# Patient Record
Sex: MALE | Race: WHITE | Employment: UNEMPLOYED | URBAN - METROPOLITAN AREA
[De-identification: names, ages, dates, MRNs, and addresses within clinical notes are randomized per-mention and may not be internally consistent; named-entity substitution may affect disease eponyms.]

---

## 2020-10-05 ENCOUNTER — HOSPITAL ENCOUNTER (INPATIENT)
Age: 30
LOS: 4 days | Discharge: HOME | DRG: 885 | End: 2020-10-09
Attending: EMERGENCY MEDICINE | Admitting: PSYCHIATRY & NEUROLOGY
Payer: COMMERCIAL

## 2020-10-05 PROBLEM — F32.A DEPRESSION WITH SUICIDAL IDEATION: Status: ACTIVE | Noted: 2020-10-05

## 2020-10-05 PROBLEM — R45.851 DEPRESSION WITH SUICIDAL IDEATION: Status: ACTIVE | Noted: 2020-10-05

## 2020-10-05 PROCEDURE — 99284 EMERGENCY DEPT VISIT MOD MDM: CPT

## 2020-10-05 PROCEDURE — 6370000000 HC RX 637 (ALT 250 FOR IP): Performed by: PSYCHIATRY & NEUROLOGY

## 2020-10-05 PROCEDURE — 99285 EMERGENCY DEPT VISIT HI MDM: CPT

## 2020-10-05 PROCEDURE — 1240000000 HC EMOTIONAL WELLNESS R&B

## 2020-10-05 RX ORDER — ACETAMINOPHEN 325 MG/1
650 TABLET ORAL EVERY 4 HOURS PRN
Status: DISCONTINUED | OUTPATIENT
Start: 2020-10-05 | End: 2020-10-09 | Stop reason: HOSPADM

## 2020-10-05 RX ORDER — MAGNESIUM HYDROXIDE/ALUMINUM HYDROXICE/SIMETHICONE 120; 1200; 1200 MG/30ML; MG/30ML; MG/30ML
30 SUSPENSION ORAL EVERY 6 HOURS PRN
Status: DISCONTINUED | OUTPATIENT
Start: 2020-10-05 | End: 2020-10-09 | Stop reason: HOSPADM

## 2020-10-05 RX ORDER — TRAZODONE HYDROCHLORIDE 50 MG/1
50 TABLET ORAL NIGHTLY PRN
Status: DISCONTINUED | OUTPATIENT
Start: 2020-10-05 | End: 2020-10-09 | Stop reason: HOSPADM

## 2020-10-05 RX ORDER — POLYETHYLENE GLYCOL 3350 17 G/17G
17 POWDER, FOR SOLUTION ORAL DAILY PRN
Status: DISCONTINUED | OUTPATIENT
Start: 2020-10-05 | End: 2020-10-09 | Stop reason: HOSPADM

## 2020-10-05 RX ORDER — NICOTINE 21 MG/24HR
1 PATCH, TRANSDERMAL 24 HOURS TRANSDERMAL DAILY
Status: DISCONTINUED | OUTPATIENT
Start: 2020-10-06 | End: 2020-10-09 | Stop reason: HOSPADM

## 2020-10-05 RX ORDER — IBUPROFEN 400 MG/1
400 TABLET ORAL EVERY 6 HOURS PRN
Status: DISCONTINUED | OUTPATIENT
Start: 2020-10-05 | End: 2020-10-09 | Stop reason: HOSPADM

## 2020-10-05 RX ORDER — HYDROXYZINE 50 MG/1
50 TABLET, FILM COATED ORAL 3 TIMES DAILY PRN
Status: DISCONTINUED | OUTPATIENT
Start: 2020-10-05 | End: 2020-10-09 | Stop reason: HOSPADM

## 2020-10-05 RX ADMIN — HYDROXYZINE HYDROCHLORIDE 50 MG: 50 TABLET, FILM COATED ORAL at 23:13

## 2020-10-05 RX ADMIN — ACETAMINOPHEN 650 MG: 325 TABLET, FILM COATED ORAL at 23:13

## 2020-10-05 ASSESSMENT — ENCOUNTER SYMPTOMS
ABDOMINAL PAIN: 0
COUGH: 0
VOMITING: 0
DIARRHEA: 0
BACK PAIN: 0
SHORTNESS OF BREATH: 0

## 2020-10-05 ASSESSMENT — PAIN SCALES - GENERAL
PAINLEVEL_OUTOF10: 7
PAINLEVEL_OUTOF10: 5
PAINLEVEL_OUTOF10: 8
PAINLEVEL_OUTOF10: 7

## 2020-10-05 ASSESSMENT — SLEEP AND FATIGUE QUESTIONNAIRES
AVERAGE NUMBER OF SLEEP HOURS: 6
DO YOU HAVE DIFFICULTY SLEEPING: NO
DO YOU USE A SLEEP AID: NO

## 2020-10-05 ASSESSMENT — PAIN DESCRIPTION - PAIN TYPE
TYPE: CHRONIC PAIN
TYPE: CHRONIC PAIN
TYPE: ACUTE PAIN

## 2020-10-05 ASSESSMENT — PATIENT HEALTH QUESTIONNAIRE - PHQ9: SUM OF ALL RESPONSES TO PHQ QUESTIONS 1-9: 12

## 2020-10-05 ASSESSMENT — PAIN DESCRIPTION - LOCATION: LOCATION: NOSE

## 2020-10-05 ASSESSMENT — LIFESTYLE VARIABLES: HISTORY_ALCOHOL_USE: NO

## 2020-10-05 ASSESSMENT — PAIN DESCRIPTION - ORIENTATION: ORIENTATION: RIGHT

## 2020-10-05 ASSESSMENT — PAIN DESCRIPTION - DESCRIPTORS: DESCRIPTORS: DISCOMFORT

## 2020-10-06 PROBLEM — F33.2 SEVERE EPISODE OF RECURRENT MAJOR DEPRESSIVE DISORDER, WITHOUT PSYCHOTIC FEATURES (HCC): Status: ACTIVE | Noted: 2020-10-06

## 2020-10-06 LAB
AMPHETAMINE SCREEN URINE: NEGATIVE
BARBITURATE SCREEN URINE: NEGATIVE
BENZODIAZEPINE SCREEN, URINE: NEGATIVE
BUPRENORPHINE URINE: ABNORMAL
CANNABINOID SCREEN URINE: NEGATIVE
COCAINE METABOLITE, URINE: NEGATIVE
MDMA URINE: ABNORMAL
METHADONE SCREEN, URINE: NEGATIVE
METHAMPHETAMINE, URINE: ABNORMAL
OPIATES, URINE: POSITIVE
OXYCODONE SCREEN URINE: NEGATIVE
PHENCYCLIDINE, URINE: NEGATIVE
PROPOXYPHENE, URINE: ABNORMAL
SARS-COV-2, RAPID: NORMAL
SARS-COV-2: NORMAL
SARS-COV-2: NOT DETECTED
SOURCE: NORMAL
TEST INFORMATION: ABNORMAL
TRICYCLIC ANTIDEPRESSANTS, UR: ABNORMAL

## 2020-10-06 PROCEDURE — 99222 1ST HOSP IP/OBS MODERATE 55: CPT | Performed by: PSYCHIATRY & NEUROLOGY

## 2020-10-06 PROCEDURE — U0003 INFECTIOUS AGENT DETECTION BY NUCLEIC ACID (DNA OR RNA); SEVERE ACUTE RESPIRATORY SYNDROME CORONAVIRUS 2 (SARS-COV-2) (CORONAVIRUS DISEASE [COVID-19]), AMPLIFIED PROBE TECHNIQUE, MAKING USE OF HIGH THROUGHPUT TECHNOLOGIES AS DESCRIBED BY CMS-2020-01-R: HCPCS

## 2020-10-06 PROCEDURE — 6370000000 HC RX 637 (ALT 250 FOR IP): Performed by: PSYCHIATRY & NEUROLOGY

## 2020-10-06 PROCEDURE — 1240000000 HC EMOTIONAL WELLNESS R&B

## 2020-10-06 PROCEDURE — 80307 DRUG TEST PRSMV CHEM ANLYZR: CPT

## 2020-10-06 RX ORDER — SERTRALINE HYDROCHLORIDE 25 MG/1
25 TABLET, FILM COATED ORAL DAILY
Status: DISCONTINUED | OUTPATIENT
Start: 2020-10-06 | End: 2020-10-07

## 2020-10-06 RX ADMIN — HYDROXYZINE HYDROCHLORIDE 50 MG: 50 TABLET, FILM COATED ORAL at 20:30

## 2020-10-06 RX ADMIN — TRAZODONE HYDROCHLORIDE 50 MG: 50 TABLET ORAL at 20:30

## 2020-10-06 RX ADMIN — IBUPROFEN 400 MG: 400 TABLET, FILM COATED ORAL at 20:24

## 2020-10-06 RX ADMIN — SERTRALINE HYDROCHLORIDE 25 MG: 25 TABLET ORAL at 16:47

## 2020-10-06 ASSESSMENT — LIFESTYLE VARIABLES: HISTORY_ALCOHOL_USE: NO

## 2020-10-06 ASSESSMENT — PAIN SCALES - GENERAL: PAINLEVEL_OUTOF10: 5

## 2020-10-06 NOTE — H&P
HISTORY and [facility] [id number]       NAME:  Caity Dukes  MRN: [id number]   YOB: 1990   Date: 10/6/2020   Age: 26 y.o. Gender: male     COMPLAINT AND PRESENT HISTORY:      Caity Dukes is 26 y.o.,  male, admitted via ED because of depression. Per chart review, Pt presented to ED for mental health evaluation for suicidal ideation. Pt voiced visual hallucinations of him committing suicide by cutting his throat with knives. Pt denies current suicidal ideation. Pt denies any homicidal ideation. Pt reported previous suicide attempt when he was a teen by trying to shoot himself but the gun did not discharge. Pt has fair sleep and appetite. No current auditory, visual or tactile hallucinations. Current everyday smoker 2 PPD cigarettes. Smoking cessation encouraged. Pt denies any alcohol use of illicit drug use. Reports he currently lives with his girlfriend. 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 History of left knee surgery 2 years ago with residual knee pain. Rating pain 9/10 in intensity at times. Takes percocet at home with good relief. Tylenol available while here prn. Pt denies any other somatic complaints at this time including chest pain/pressure, palpitations, SOB, recent URI, N/V/D or constipation, fever or chills. DIAGNOSTIC RESULTS     PAST MEDICAL HISTORY   History reviewed. No pertinent past medical history. Pt denies any history of Diabetes mellitus type 2, hypertension, stroke, heart disease, COPD, Asthma, GERD, HLD, Cancer, Seizures,Thyroid disease, Kidney Disease, Hepatitis, TB.    SURGICAL HISTORY       Past Surgical History:   Procedure Laterality Date    KNEE ARTHROSCOPY      TONSILLECTOMY         FAMILY HISTORY     History reviewed. No pertinent family history.     SOCIAL HISTORY       Social History     Socioeconomic History    Marital status: Single     Spouse name: None    Number of children: None    Years of Neuropsychiatric:  See HPI. GENERAL PHYSICAL EXAM:     Vitals: /87   Pulse 92   Temp 97.3 °F (36.3 °C) (Axillary)   Resp 16   Ht 5' 10\" (1.778 m)   Wt 157 lb (71.2 kg)   SpO2 100%   BMI 22.53 kg/m²  Body mass index is 22.53 kg/m². Pt was examined with a nurse present in the room. GENERAL APPEARANCE:  Craig Caputo is 26 y.o.,  male, not obese, nourished, conscious, alert. Does not appear to be in distress or pain at this time. SKIN:  Warm, dry, no cyanosis or jaundice. HEAD:  Normocephalic, atraumatic,. EYES:  Pupils equal, reactive to light, Conjunctiva is clear, EOMs intact jose m. eyelids WNL. EARS:  No discharge, no marked hearing loss. NOSE:  No rhinorrhea, epistaxis or septal deformity. THROAT:  Not congested. No ulceration bleeding or discharge. NECK:  No stiffness, trachea central.  No palpable masses or L.N.      CHEST:  Symmetrical and equal on expansion. HEART:  Regular rate and rhythm. S1 > S2, No audible murmurs or gallops. LUNGS:  Equal on expansion, normal breath sounds. No wheezing, rhonchi or rales. ABDOMEN:  Soft on palpation. No localized tenderness. No guarding or rigidity. No palpable organomegaly. LYMPHATICS:  No palpable cervical lymphadenopathy. LOCOMOTOR, BACK AND SPINE:  No tenderness or deformities. EXTREMITIES:  Symmetrical, no pretibial edema. No calf tenderness. No discoloration or ulcerations. NEUROLOGIC:  The patient is conscious, alert, oriented,Cranial nerve II-XII intact, taste and smell were not examined. No apparent focal sensory or motor deficits. Muscle strength equal Jose M. No facial droop, tongue protrudes centrally, no slurring of the speech. PROVISIONAL DIAGNOSES:      Active Problems:    Depression with suicidal ideation  Resolved Problems:    * No resolved hospital problems.  Gracy Gottron, APRN - CNP on 10/6/2020 at 3:42 PM

## 2020-10-06 NOTE — BH NOTE
Patient given tobacco quitline number 93849667874 at this time, refusing to call at this time, states \" I just dont want to quit now\"- patient given information as to the dangers of long term tobacco use. Continue to reinforce the importance of tobacco cessation.

## 2020-10-06 NOTE — PLAN OF CARE
hours    PLAN/TREATMENT RECOMMENDATIONS:     continue group therapy , medications compliance, goal setting, individualized assessments and care, continue to monitor pt on unit      SHORT-TERM GOALS:   Time frame for Short-Term Goals: 5-7 days    LONG-TERM GOALS:  Time frame for Long-Term Goals: 6 months  Members Present in Team Meeting: See [address] Shala Davis

## 2020-10-06 NOTE — SUICIDE SAFETY PLAN
SAFETY PLAN    A suicide Safety Plan is a document that supports someone when they are having thoughts of suicide. Warning Signs that indicate a suicidal crisis may be developing: What (situations, thoughts, feelings, body sensations, behaviors, etc.) do you experience that lets you know you are beginning to think about suicide? 1. Visions of self harm      Internal Coping Strategies:  What things can I do (relaxation techniques, hobbies, physical activities, etc.) to take my mind off my problems without contacting another person? 1. Thinking about my kids not wanting them to not have a father  2. Not wanting to move my pain to may family         People and social settings that provide distraction: Who can I call or where can I go to distract me? 1. Name: Vu Wright  Phone: [phone]  2. Name: Juliet Downs  Phone: [phone]     People whom I can ask for help: Who can I call when I need help - for example, friends, family, clergy, someone else? 1. Name: Vu Wright                GDCUE:[phone]   2. Name:  Juliet Downs                Phone: [phone]       Professionals or [address] [place], [place] [address]vd I can contact during a crisis: Who can I call for help - for example, my doctor, my psychiatrist, my psychologist, a mental health provider, a suicide hotline? 1. Clinician Name: Not linked   Phone:       Clinician Pager or Emergency Contact #:     2. Clinician Name:    Phone:       Clinician Pager or Emergency Contact #:     3. Suicide Prevention Lifeline: 4-763-873-TALK (3147)    4. [address] [place] [address] Emergency Services -  for example, [facility] suicide hotline, [address] [place] [address] Avenue: [facility]      Emergency Services Address: [facility]       Emergency Services Phone: [phone]    Making the environment safe: How can I make my environment (house/apartment/living space) safer? For example, can I remove guns, medications, and other items?   1. Diana removed from home by Jamie Mcrae Girlfrienbigg

## 2020-10-06 NOTE — FLOWSHEET NOTE
*Patient participated in the Spirituality Group via zoom       10/06/20 1528   Encounter Summary   Services provided to: Patient   Referral/Consult From: Rounding  (zoom)   Continue Visiting   (10/6/20)   Complexity of Encounter Moderate   Length of Encounter 30 minutes   Spiritual/Restorationism   Type Spiritual support   Assessment Calm; Approachable   Intervention Active listening   Outcome Receptive;Engaged in conversation;Expressed gratitude

## 2020-10-06 NOTE — BH NOTE
`Behavioral [facility] [place]  Admission Note     Admission Type:   Admission Type: Voluntary    Reason for admission:  Reason for Admission: Suicidal ideations with no specific plan; Visual Hallucinations of dark figures    PATIENT STRENGTHS:  Strengths: Communication, Employment    Patient Strengths and Limitations:  Limitations: Difficulty problem solving/relies on others to help solve problems    Addictive Behavior:   Addictive Behavior  In the past 3 months, have you felt or has someone told you that you have a problem with:  : None  Do you have a history of Chemical Use?: No  Do you have a history of Alcohol Use?: No  Do you have a history of Street Drug Abuse?: No  Histroy of Prescripton Drug Abuse?: Yes    Medical Problems:   History reviewed. No pertinent past medical history.     Status EXAM:  Status and Exam  Normal: Yes  Affect: Appropriate  Level of Consciousness: Alert  Mood:Normal: No  Mood: Anxious  Motor Activity:Normal: Yes  Interview Behavior: Cooperative  Preception: [place] to Person, Layvonne Steve to Time, [place] to Place, [place] to Situation  Attention:Normal: No  Attention: Distractible  Thought Processes: Circumstantial  Thought Content:Normal: No  Thought Content: Preoccupations  Hallucinations: Visual (Comment)(Reports seeing dark figures)  Delusions: No  Memory:Normal: No  Memory: Poor Recent, Poor Remote  Insight and Judgment: No  Insight and Judgment: Poor Judgment, Poor Insight  Present Suicidal Ideation: No  Present Homicidal Ideation: No    Tobacco Screening:  Practical Counseling, on admission, emil X, if applicable and completed (first 3 are required if patient doesn't refuse):            ( )  Recognizing danger situations (included triggers and roadblocks)                    ( )  Coping skills (new ways to manage stress, exercise, relaxation techniques, changing routine, distraction)                                                           ( )  Basic information about quitting (benefits of quitting, techniques in how to quit, available resources  ( ) Referral for counseling faxed to Rebecca                                           ( X) Patient refused counseling  ( ) Patient has not smoked in the last 30 days    Metabolic Screening:    No results found for: LABA1C    No results found for: CHOL  No results found for: TRIG  No results found for: HDL  No components found for: LDLCAL  No results found for: LABVLDL      Body mass index is 22.53 kg/m². BP Readings from Last 2 Encounters:   10/05/20 128/67   12/15/16 140/88           Pt admitted with followings belongings:  Dentures: None  Vision - Corrective Lenses: None  Hearing Aid: None  Jewelry: Earrings, Other (Comment)(1 clear stoned earring; Tongue piercing)  Body Piercings Removed: No  Clothing: Socks, Pajamas, Shirt, Footwear, Pants, Jacket / coat  Were All Patient Medications Collected?: Not Applicable  Other Valuables: Keys, Other (Comment), Wallet(Lighter;$5;[id number]; Social Secuirty Card; Drivers license)     Valuables sent home with N/A. Valuables placed in safe in security envelope, number:  \[id number]. Patient's home medications need verified. Patient oriented to surroundings and program expectations and copy of patient rights given. Received admission packet:  Yes. Consents reviewed, signed Yes. Refused the photograph release form. Patient verbalize understanding:  yes. Patient education on precautions: yes    Pt admitted to 209 per MD order. Pt changed into hospital attire. Pt scanned with metal detector. All pt belongings checked for contraband and locked in utility closet. Pt oriented to unit and rules. Handbook given. Nourishment provided. MD paged for orders. Will monitor pt for safety and behavior. Patient denies all during admission except visual hallucinations of dark figures and increased anxiety due to being in a new place.  Patient reports having on going suicidal ideations with his most recent visualizing himself writing a suicide note. Patient reports attempting to shoot self at the age of 10 but the firearm did not fire. Patient is not connected to any Mental health providers. Patient reports chronic pain and states he is prescribed percocet for the pain. Patient reported accidentally overdosing on fentanyl 2 months ago when he tried to by percocet off the street.                       Francisco Peraza RN

## 2020-10-06 NOTE — ED NOTES
Paperwork and pt's belongings provided to [facility] staff. Pt escorted to [facility] via two [facility] staff members. Pt cooperative exiting [facility].

## 2020-10-06 NOTE — ED NOTES
Provisional Diagnosis:  Depression with suicidal ideation      Psychosocial and Contextual Factors: Pt has issues with social enviroment. Pt has issues with relationships. Pt is currently off pt's medications. C-SSRS Summary:    Patient: X    Family:     Agency: X (EPIC)    Present Suicidal Behavior:     Verbal: X    Attempt:     Past Suicidal Behavior:     Verbal: X    Attempt: X    Self- Injurious/ Self-Mutilation:  Pt denies    Trauma History:Pt denies    Protective Factors: Pt has housing. Pt has support. Pt is employed. Risk Factors: Pt has poor judgement and coping skills. Pt is not linked. Substance Abuse: Pt reports \"rare\" alcohol abuse. Pt has purchased Perc 30 off the street when pt is out of pt's script. Clinical Summary:  Elizabeth Larry is a 26year old male who presents to the ED via friend. Pt is suicidal. Pt has been envisioning self committing suicide for the past two months. Pt reports pt decided pt needed help after pt envisioned self writing a suicide note last week. Pt started having these visions at the end of July this year after pt took what pt thought was a perc 30 but was actually sold fetanyl and overdosed at home. Pt's girlfriend found pt and it took 4 shots of narcan to save pt. Pt reports pt is prescribed perc 10's but ran out of them and purchased the perc 30 off of the street. Pt denies having substance abuse issues with percocet's. Pt denies the use of illegal drugs. Pt reports \"rarely\" drinking alcohol. Pt denies HI. Pt attempted suicide at the age of 10 by putting a loaded gun to pt's head and pulling the trigger. The gun did not go off. Pt reports pt sees dark figures walking around at times and has been for the past 6 years. Pt has been previously diagnosed with severe depression, bipolar disorder and PTSD , per pt. Pt has been prescribed Zoloft in the past and felt as though it helped pt. Pt is not linked. Pt has no previous inpatient psychiatric hospitalizations. Pt denies substance abuse. Pt reports poor sleep and a decrease in pt's appetite. Level of Care Disposition:. SW consulted wit Kristopher Joshi from psychiatry, Pt accepted for an inpatient admission to the [facility] for safety and stabilization.

## 2020-10-06 NOTE — PLAN OF CARE
Problem: Altered Mood, Depressive Behavior:  Goal: Ability to disclose and discuss suicidal ideas will improve  Description: Ability to disclose and discuss suicidal ideas will improve  Outcome: Ongoing   Patient remained isolative to his room throughout the majority of this shift. Patient did come to the dayroom to participate in spiritual group via tablet. Patient has been calm/cooperative this shift. He has not showered this shift, is wearing own clothes. Patient denies depression/anxiety, also denies suicidal/homicidal ideation this shift. Problem: Altered Mood, Depressive Behavior:  Goal: Absence of self-harm  Description: Absence of self-harm  Outcome: Ongoing   Patient denies suicidal ideation this shift. Problem: Tobacco Use:  Goal: Inpatient tobacco use cessation counseling participation  Description: Inpatient tobacco use cessation counseling participation  Outcome: Ongoing   Patient using nicotine patch per order. Patch placed this morning to right shoulder. Problem: Pain:  Goal: Control of chronic pain  Description: Control of chronic pain  Outcome: Ongoing   Patient admits to chronic pain, no complaints of uncontrolled pain this shift.

## 2020-10-06 NOTE — BH NOTE
During 1:1 interview, patient states he saw \"dark figures\" in his room last night. States they do not scare him, nor do they speak to him. Patient states he has been seeing these \"dark figures\" for the past 6 years. Denies suicidal/homicidal ideation, patient flat, calm/cooperative.

## 2020-10-06 NOTE — PROGRESS NOTES
Pharmacy Medication History Note      List of current medications patient is taking is complete. Source of information: Camilla Anglin, [facility])    Changes made to medication list:  Medications removed (include reason, ex. therapy complete or physician discontinued, noncompliance):  none    Medications added/doses adjusted:  none    Other notes (ex. Recent course of antibiotics, Coumadin dosing): The patient filled a 30 day supply of Percocet in August at [facility] and a 13 day supply at [facility] in July. He has not filled any other prescriptions at either pharmacy since 2016. Please let me know if you have any questions about this encounter. Thank you!     Electronically signed by Jimbo Queen, [facility] on 10/6/2020 at 9:43 AM

## 2020-10-06 NOTE — PROGRESS NOTES
Admission note  Behavioral Health  10/6/2020  CHIEF COMPLAINT: Suicidal ideation    Reviewed patient's current plan of care and vital signs with nursing staff. Sleep:  8 hours last night  Attending groups: No on COVID unit    SUBJECTIVE:    Met with patient in his room. Patient was admitted for suicidal ideation without a plan. He reports that he felt unsafe when he imagined himself writing his own suicide letter. He presented himself to the emergency room. 1 past suicide attempt age 10 attempted to shoot self gun would not go off. Denies any previous psychiatric hospitalizations or medications. he denies any psychiatric provider in the community and denies being on any psychiatric medications. He reports that he has been more depressed since an accidental overdose a month ago. Last month he had ran out of his pain medications and bought Percocet on the street, it had fentanyl in it, and he overdosed. He is seeing a pain medicine specialist for a ruptured disc in his back and arthritis in his neck. Reviewed O a RRS last filled Percocet 8/10/2020 #50, he had a previous fill before in July 2020, otherwise OARRs was unremarkable. He rates that he is prescribed Percocet 10 to take up to 4 times a day for 30-day.,  And he often runs out early. He denies overtaking medication, reports that he usually runs out 2 weeks before he can refill it. When he runs out he has a spot Percocet from the street and obtained from his family members. He denies daily use of pain medication, he denies any withdrawal symptoms when not taking pain medication, he denies any legal problems related to opiate medication use. He is currently living with his girlfriend, he is employed and works full-time, he has 1 young daughters. He denies any substance use or abuse in the past.  His urine drug screen was positive for opiates only. Reports that he took Norco from his \"aunt\" a few days ago.   He denies that he has any substance use problems, and is resistant to any recovery services. He currently denies any withdrawal symptoms. Discussed the empty Suboxone wrapper in his wallet, he reports that he was holding it for a friend, denies using any Suboxone. Per chart there are reports of visual hallucinations of seeing \"dark figures\". He denies seeing any dark figures during interview, denies any voices. He currently rates his mood 5 out of 10 (10 best) he denies any suicidal ideation intent or plan  Reports that his appetite is improved today and he is feeling slightly better  Per staff patient compliant with medications and is interactive with staff    Mental Status Exam  Level of consciousness:  Within normal limits  Appearance: Hospital attire, sitting up in bed, with good grooming and hygiene   Behavior/Motor: No abnormalities noted  Attitude toward examiner:  Cooperative, attentive, good eye contact  Speech:  spontaneous, normal rate, normal volume and well articulated  Mood: \"Okay\" rates 5/10  Affect: Guarded  Thought processes:  linear and coherent  Thought content:  denies homicidal ideation  Suicidal Ideation: Denies suicidal ideation  Delusions:  no evidence of delusions  Perceptual Disturbance:  denies any perceptual disturbance during interview, does not appear to be responding to any internal stimuli  Cognition:  Oriented to self, location, time, and situation  Memory: age appropriate  Insight & Judgement: Poor  Medication side effects:  denies       Data   height is 5' 10\" (1.778 m) and weight is 157 lb (71.2 kg). His axillary temperature is 97.3 °F (36.3 °C). His blood pressure is 117/87 and his pulse is 92. His respiration is 16 and oxygen saturation is 100%. Labs:   Admission on 10/05/2020   Component Date Value Ref Range Status    SARS-CoV-2 10/06/2020 PENDING   Incomplete    SARS-CoV-2, Rapid 10/06/2020        Final    Source 10/06/2020 . NASOPHARYNGEAL SWAB   Final    SARS-CoV-2 10/06/2020        Final    Amphetamine Screen, Ur 10/06/2020 NEGATIVE  NEGATIVE Final    Comment:       (Positive cutoff 1000 ng/mL)                  Barbiturate Screen, Ur 10/06/2020 NEGATIVE  NEGATIVE Final    Comment:       (Positive cutoff 200 ng/mL)                  Benzodiazepine Screen, Urine 10/06/2020 NEGATIVE  NEGATIVE Final    Comment:       (Positive cutoff 200 ng/mL)                  Cocaine Metabolite, Urine 10/06/2020 NEGATIVE  NEGATIVE Final    Comment:       (Positive cutoff 300 ng/mL)                  Methadone Screen, Urine 10/06/2020 NEGATIVE  NEGATIVE Final    Comment:       (Positive cutoff 300 ng/mL)                  Opiates, Urine 10/06/2020 POSITIVE* NEGATIVE Final    Comment:       (Positive cutoff 300 ng/mL)                  Phencyclidine, Urine 10/06/2020 NEGATIVE  NEGATIVE Final    Comment:       (Positive cutoff 25 ng/mL)                  Propoxyphene, Urine 10/06/2020 NOT REPORTED  NEGATIVE Final    Cannabinoid Scrn, Ur 10/06/2020 NEGATIVE  NEGATIVE Final    Comment:       (Positive cutoff 50 ng/mL)                  Oxycodone Screen, Ur 10/06/2020 NEGATIVE  NEGATIVE Final    Comment:       (Positive cutoff 100 ng/mL)                  Methamphetamine, Urine 10/06/2020 NOT REPORTED  NEGATIVE Final    Tricyclic Antidepressants, Urine 10/06/2020 NOT REPORTED  NEGATIVE Final    MDMA, Urine 10/06/2020 NOT REPORTED  NEGATIVE Final    Buprenorphine Urine 10/06/2020 NOT REPORTED  NEGATIVE Final    Test Information 10/06/2020 Assay provides medical screening only. The absence of expected drug(s) and/or metabolite(s) may indicate diluted or adulterated urine, limitations of testing or timing of collection. Final    Comment: Testing for legal purposes should be confirmed by another method. To request confirmation   of test result, please call the lab within 7 days of sample submission.               Medications  Current Facility-Administered Medications: acetaminophen (TYLENOL) tablet 650 mg, 650 mg, Oral, Q4H PRN  aluminum & magnesium hydroxide-simethicone (MAALOX) 200-200-20 MG/5ML suspension 30 mL, 30 mL, Oral, Q6H PRN  hydrOXYzine (ATARAX) tablet 50 mg, 50 mg, Oral, TID PRN  ibuprofen (ADVIL;MOTRIN) tablet 400 mg, 400 mg, Oral, Q6H PRN  nicotine (NICODERM CQ) 14 MG/24HR 1 patch, 1 patch, Transdermal, Daily  polyethylene glycol (GLYCOLAX) packet 17 g, 17 g, Oral, Daily PRN  traZODone (DESYREL) tablet 50 mg, 50 mg, Oral, Nightly PRN    ASSESSMENT  Severe episode of recurrent major depressive disorder, without psychotic features ([facility] [address]     PLAN  Discussed plan with Dr. Banerjee  Attempt to develop insight  Psycho-education conducted. Supportive Therapy conducted. Probable discharge is indeterminate  Follow-up daily while inpatient        Electronically signed by RICHMOND Ng CNP on 10/6/20 at 3:11 PM EDT    **This report has been created using voice recognition software. It may contain minor errors which are inherent in voice recognition technology. **    I have examined the patient and confirmed the NP's findings. I agree with the plan above. Additional information noted below-    I have noted the past psychiatric history above    Drug history-patient is on prescription opioids for spinal vertebral problems. He has a history of using opioids illicitly    Personal history-patient has 4 minor daughters. He cites them as a protective factor.   He has custody of them every other weekend    Family history-the patient denies any history of psychiatric problems in his family    Plan-Will start the patient on Zoloft 50 mg daily    Cheng Friedman
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
## 2020-10-06 NOTE — PLAN OF CARE
Problem: Altered Mood, Depressive Behavior:  Goal: Able to verbalize and/or display a decrease in depressive symptoms  Description: Able to verbalize and/or display a decrease in depressive symptoms  Outcome: Ongoing     Problem: Altered Mood, Depressive Behavior:  Goal: Ability to disclose and discuss suicidal ideas will improve  Description: Ability to disclose and discuss suicidal ideas will improve  10/6/2020 1926 by Clifton Campoverde RN  Outcome: Ongoing     Problem: Altered Mood, Depressive Behavior:  Goal: Absence of self-harm  Description: Absence of self-harm  10/6/2020 1926 by Clifton Campoverde RN  Outcome: Ongoing     Patient is free from self harm today. Patient denies any feelings of anxiety or depression during talk time. Patient also denies suicidal ideations, homicidal ideations and auditory hallucinations. Patient reports seeing dark figures and states he has been seeing them for years. Patient states they do not frighten him nor do they try to communicate with him. Patient discussed wanting to be set up with a pain management clinic to help reduce his chronic pain. Patient informed of medication ordered to assist with pain while in patient and is accepting of it at this time. Q 15 minute checks maintained for patient safety.
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
## 2020-10-07 PROBLEM — F31.9 BIPOLAR DEPRESSION (HCC): Status: ACTIVE | Noted: 2020-10-07

## 2020-10-07 PROCEDURE — 6370000000 HC RX 637 (ALT 250 FOR IP): Performed by: NURSE PRACTITIONER

## 2020-10-07 PROCEDURE — 1240000000 HC EMOTIONAL WELLNESS R&B

## 2020-10-07 PROCEDURE — 99232 SBSQ HOSP IP/OBS MODERATE 35: CPT | Performed by: PSYCHIATRY & NEUROLOGY

## 2020-10-07 PROCEDURE — 6370000000 HC RX 637 (ALT 250 FOR IP): Performed by: PSYCHIATRY & NEUROLOGY

## 2020-10-07 RX ORDER — FLUOXETINE 10 MG/1
10 CAPSULE ORAL DAILY
Status: DISCONTINUED | OUTPATIENT
Start: 2020-10-08 | End: 2020-10-09 | Stop reason: HOSPADM

## 2020-10-07 RX ORDER — OLANZAPINE 10 MG/1
5 TABLET, ORALLY DISINTEGRATING ORAL NIGHTLY
Status: DISCONTINUED | OUTPATIENT
Start: 2020-10-07 | End: 2020-10-09 | Stop reason: HOSPADM

## 2020-10-07 RX ADMIN — SERTRALINE HYDROCHLORIDE 25 MG: 25 TABLET ORAL at 09:51

## 2020-10-07 RX ADMIN — OLANZAPINE 5 MG: 10 TABLET, ORALLY DISINTEGRATING ORAL at 22:17

## 2020-10-07 RX ADMIN — TRAZODONE HYDROCHLORIDE 50 MG: 50 TABLET ORAL at 22:17

## 2020-10-07 RX ADMIN — HYDROXYZINE HYDROCHLORIDE 50 MG: 50 TABLET, FILM COATED ORAL at 22:17

## 2020-10-07 NOTE — GROUP NOTE
Group Therapy Note    Date: 10/7/2020    Group Start Time: 1100  Group End Time: 4494  Group Topic: Psychoeducation    VINNY Guillen, [address]        Group Therapy Note    Attendees: 5/14      Pt did not attend RT skills group d/t resting in room despite staff invitation to attend. 1:1 talk time offered as alternative to group session, pt declined.

## 2020-10-07 NOTE — PROGRESS NOTES
Daily Progress Note  Behavioral Health  10/7/2020  CHIEF COMPLAINT: Suicidal ideation    Reviewed patient's current plan of care and vital signs with nursing staff. Sleep:  7 hours last night  Attending groups: Yes    SUBJECTIVE:    Met with Javid Etienne in his room. He was more open to talk about his past psychiatric issues today. He reports that he was diagnosed approximately 7 years ago with bipolar and PTSD. He had been seeing Dr. Glenna Victoria for psychiatry, he reports that he was on Zoloft 200 mg daily, Abilify 5 mg daily and another unknown medication. He said that he felt that the medication had caused him to feel angry all the time and had stopped taking it, he has been nonmedicated for numerous years. Today he reports that he has periods of elevated mood with feelings of grandiosity, decreased need to sleep, impulsiveness, and racing thoughts. This is followed by a period of irritability with decreased need to sleep and racing thoughts. He is reports the manic episodes last approximately 7 days, he experiences them 5 times a year, unsure when last episode was. He reports that he is more often depressed than he feels manic. During his depression he has lack of energy fatigue problems with sleep problems with appetite and suicidal ideations. He also reports that he sees \"shadow figures\" only during his depression, he denies that these figures talk to him he says that he usually sees him out of the corner of his eye but that they resolve during times of normal mood and melvina. He last reports seeing the shadows 3 nights ago has not seen any since. He endorses that he was depressed and suicidal but is beginning to feel better. Rates his mood 6 out of 10 (10 best). He denies any current suicidal ideation intent or plan. He started attending groups today reports his appetite is good and feels safe on the unit.   He reports he like to follow-up with [facility] in the community because it is near his house outpatient once discharged. We discussed medications today. He would like to try medication for his depression and mood instability. We discussed Prozac and Zyprexa reviewed with him side effects risk benefit and alternative treatment and he would like to try this medication regimen. Mental Status Exam  Level of consciousness:  Within normal limits  Appearance: T-shirt and pajama pants, seated in chair, with good grooming and hygiene   Behavior/Motor: Calm no psychomotor  abnormalities noted  Attitude toward examiner:  Cooperative, attentive, good eye contact  Speech:  spontaneous, normal rate, normal volume and well articulated  Mood: \"Okay\" reports improvement of mood since being admitted  Affect: Broad  Thought processes:  linear, goal directed and coherent  Thought content:  denies homicidal ideation  Suicidal Ideation: Denies suicidal ideation  Delusions:  no evidence of delusions  Perceptual Disturbance:  denies any perceptual disturbance  Cognition:  Oriented to self, location, time, and situation  Memory: age appropriate  Insight & Judgement: improving  Medication side effects:  denies       Data   height is 5' 10\" (1.778 m) and weight is 157 lb (71.2 kg). His oral temperature is 98 °F (36.7 °C). His blood pressure is 118/60 and his pulse is 85. His respiration is 16 and oxygen saturation is 100%. Labs:   Admission on 10/05/2020   Component Date Value Ref Range Status    SARS-CoV-2 10/06/2020 Not Detected  Not Detected Final    Comment:       The specimen is NEGATIVE for SARS-CoV-2, the novel coronavirus associated with COVID-19. A negative result does not rule out COVID-19. This test has been authorized by the FDA under an Emergency Use Authorization (EUA) for use   by authorized laboratories. [employer] SARS-CoV-2 Reagents for [employer] System are designed to detect the virus that causes   COVID-19 in patients with signs and symptoms of infection who are suspected of COVID-19.   An individual without symptoms of COVID-19 and who is not shedding SARS-CoV-2 virus would   expect to have a negative (not detected) result in this assay. Fact sheet for Healthcare Providers: [employer]stattoo[employer]com  Fact sheet for Patients: kstattoo.com        METHODOLOGY: RT-PCR      SARS-CoV-2, Rapid 10/06/2020        Final    Source 10/06/2020 . NASOPHARYNGEAL SWAB   Final    SARS-CoV-2 10/06/2020        Final    Amphetamine Screen, Ur 10/06/2020 NEGATIVE  NEGATIVE Final    Comment:       (Positive cutoff 1000 ng/mL)                  Barbiturate Screen, Ur 10/06/2020 NEGATIVE  NEGATIVE Final    Comment:       (Positive cutoff 200 ng/mL)                  Benzodiazepine Screen, Urine 10/06/2020 NEGATIVE  NEGATIVE Final    Comment:       (Positive cutoff 200 ng/mL)                  Cocaine Metabolite, Urine 10/06/2020 NEGATIVE  NEGATIVE Final    Comment:       (Positive cutoff 300 ng/mL)                  Methadone Screen, Urine 10/06/2020 NEGATIVE  NEGATIVE Final    Comment:       (Positive cutoff 300 ng/mL)                  Opiates, Urine 10/06/2020 POSITIVE* NEGATIVE Final    Comment:       (Positive cutoff 300 ng/mL)                  Phencyclidine, Urine 10/06/2020 NEGATIVE  NEGATIVE Final    Comment:       (Positive cutoff 25 ng/mL)                  Propoxyphene, Urine 10/06/2020 NOT REPORTED  NEGATIVE Final    Cannabinoid Scrn, Ur 10/06/2020 NEGATIVE  NEGATIVE Final    Comment:       (Positive cutoff 50 ng/mL)                  Oxycodone Screen, Ur 10/06/2020 NEGATIVE  NEGATIVE Final    Comment:       (Positive cutoff 100 ng/mL)                  Methamphetamine, Urine 10/06/2020 NOT REPORTED  NEGATIVE Final    Tricyclic Antidepressants, Urine 10/06/2020 NOT REPORTED  NEGATIVE Final    MDMA, Urine 10/06/2020 NOT REPORTED  NEGATIVE Final    Buprenorphine Urine 10/06/2020 NOT REPORTED  NEGATIVE Final    Test Information 10/06/2020 Assay provides medical screening only. The absence of expected drug(s) and/or metabolite(s) may indicate diluted or adulterated urine, limitations of testing or timing of collection. Final    Comment: Testing for legal purposes should be confirmed by another method. To request confirmation   of test result, please call the lab within 7 days of sample submission. Medications  Current Facility-Administered Medications: sertraline (ZOLOFT) tablet 25 mg, 25 mg, Oral, Daily  acetaminophen (TYLENOL) tablet 650 mg, 650 mg, Oral, Q4H PRN  aluminum & magnesium hydroxide-simethicone (MAALOX) 200-200-20 MG/5ML suspension 30 mL, 30 mL, Oral, Q6H PRN  hydrOXYzine (ATARAX) tablet 50 mg, 50 mg, Oral, TID PRN  ibuprofen (ADVIL;MOTRIN) tablet 400 mg, 400 mg, Oral, Q6H PRN  nicotine (NICODERM CQ) 14 MG/24HR 1 patch, 1 patch, Transdermal, Daily  polyethylene glycol (GLYCOLAX) packet 17 g, 17 g, Oral, Daily PRN  traZODone (DESYREL) tablet 50 mg, 50 mg, Oral, Nightly PRN    ASSESSMENT    Bipolar disorder, depressed. PLAN  Patient s symptoms   show no change  Discussed plan with Dr. Banerjee  Start Prozac 10 mg by mouth daily  Start Zyprexa 5 mg by mouth nightly  She will work to link patient with [facility] for outpatient psychiatry  Ordered labs for tomorrow CBC, CMP, TSH, lipids  Attempt to develop insight  Psycho-education conducted. Supportive Therapy conducted. Probable discharge is 2 to 3 days  Follow-up daily while inpatient    Electronically signed by RICHMOND Stover CNP on 10/7/20 at 11:00 AM EDT    **This report has been created using voice recognition software. It may contain minor errors which are inherent in voice recognition technology. **    I have examined the patient and confirmed the NP's findings. I agree with the plan above.   Additional information noted below-  Agree with plan above    Vladimir Lezama

## 2020-10-07 NOTE — PLAN OF CARE
Problem: Altered Mood, Depressive Behavior:  Goal: Ability to disclose and discuss suicidal ideas will improve  Description: Ability to disclose and discuss suicidal ideas will improve  10/7/2020 1116 by Sara Pisano RN  Outcome: Ongoing   Pt denies suicidal ideations and agrees to feeling safe on the unit. Pt agrees to seeking out staff for any needs. Problem: Altered Mood, Depressive Behavior:  Goal: Able to verbalize and/or display a decrease in depressive symptoms  Description: Able to verbalize and/or display a decrease in depressive symptoms  10/7/2020 1116 by Sara Pisano RN  Outcome: Ongoing   Pt reports decreased depression and mood. Pt is medication compliant. Pt is accepting of and cooperative during talk time. Pt is disheveled and encouraged to tend to hygiene and ADL's. Pt. Remains on q15 min checks and frequent spontaneous checks throughout shift. Pt. Safety maintained.

## 2020-10-07 NOTE — BH NOTE
PRN Note    Pt is agitated as evidence by Yelling at staff, pacing, and throwing items out of her room. Staff attempted to find and relieve the distress by talking to patient, offering snack, and trying to refocus attention by offering to put something she would prefer on TV. Pt continues flip off staff, curse at staff, and call staff \"fuckers\" and \"illuminati fuckers. \" Haldol 5mg and Ativan 2mg IM given for increased agitation. Patient allowed IM but was verbally aggressive during the administration.

## 2020-10-07 NOTE — PLAN OF CARE
[facility]  Day 3 Interdisciplinary Treatment Plan NOTE    Review Date & Time: 10/7/2020 0931    Admission Type:   Admission Type: Voluntary    Reason for admission:  Reason for Admission: Suicidal ideations with no specific plan; Visual Hallucinations of dark figures  Estimated Length of Stay: 5-7 days  Estimated Discharge Date Update: to be determined by physician    PATIENT STRENGTHS:  Patient Strengths Strengths: Communication, Employment  Patient Strengths and Limitations:Limitations: Multiple barriers to leisure interests(Chronic pain; Recent cange in mental health status)  Addictive Behavior:Addictive Behavior  In the past 3 months, have you felt or has someone told you that you have a problem with:  : None  Do you have a history of Chemical Use?: No  Do you have a history of Alcohol Use?: No  Do you have a history of Street Drug Abuse?: No  Histroy of Prescripton Drug Abuse?: Yes(Bought some percocet in July off a friend. No current use)  Medical Problems:History reviewed. No pertinent past medical history.     Risk:  Fall RiskTotal: 57  Bill Scale Bill Scale Score: 22  BVC Total: 0  Change in scores no Changes to plan of Care no    Status EXAM:   Status and Exam  Normal: Yes  Facial Expression: Brightened  Affect: Appropriate  Level of Consciousness: Alert  Mood:Normal: Yes  Mood: Anxious, Elated  Motor Activity:Normal: No  Motor Activity: Decreased  Interview Behavior: Cooperative  Preception: [place] to Person, Isidor Babinski to Time, [place] to Place, [place] to Situation  Attention:Normal: Yes  Attention: Distractible  Thought Processes: Circumstantial  Thought Content:Normal: Yes  Thought Content: Poverty of Content  Hallucinations: Visual (Comment)(Dark figures)  Delusions: No  Memory:Normal: No  Memory: Poor Remote  Insight and Judgment: No  Insight and Judgment: Poor Judgment  Present Suicidal Ideation: No  Present Homicidal Ideation: No    Daily Assessment Last Entry:             Patient Currently in Pain: Denies       Patient Monitoring:       Psychiatric Symptoms:   Depression Symptoms  Depression Symptoms: Isolative  Anxiety Symptoms  Anxiety Symptoms: No problems reported or observed. Jacquelyn Symptoms  Jacquelyn Symptoms: No problems reported or observed. Psychosis Symptoms  Delusion Type: No problems reported or observed. Suicide Risk CSSR-S:  1) Within the past month, have you wished you were dead or wished you could go to sleep and not wake up? : Yes  2) Have you actually had any thoughts of killing yourself? : Yes  3) Have you been thinking about how you might kill yourself? : No  5) Have you started to work out or worked out the details of how to kill yourself? Do you intend to carry out this plan? : No  6) Have you ever done anything, started to do anything, or prepared to do anything to end your life?: Yes  Change in Result no Change in Plan of care no      EDUCATION:   EDUCATION:   Learner Progress Toward Treatment Goals: Reviewed results and recommendations of this team, Reviewed group plan and strategies, Reviewed signs, symptoms and risk of self harm and violent behavior, Reviewed goals and plan of care    Method:small group, individual verbal education    Outcome:verbalized by patient, but needs reinforcement to obtain goals    PATIENT GOALS:  Short term: To talk to the doctor about his medication and to link with [facility]  Long term: To follow up with [facility] and continue his medication    PLAN/TREATMENT RECOMMENDATIONS UPDATE: continue with group therapies, increased socialization, continue planning for after discharge goals, continue with medication compliance    SHORT-TERM GOALS UPDATE:   Time frame for Short-Term Goals: 5-7 days    LONG-TERM GOALS UPDATE:   Time frame for Long-Term Goals: 6 months  Members Present in Team Meeting: See [address], [address]

## 2020-10-07 NOTE — GROUP NOTE
Group Therapy Note    Date: 10/7/2020    Group Start Time: 0900  Group End Time: 0915  Group Topic: Community Meeting    VINNY B[facility] A    [place], [place]        Group Therapy Note    Attendees: 5/13         Patient's Goal:  To increase interpersonal interaction. Notes:  Pt attended and participated in group. Status After Intervention:  Improved    Participation Level:  Active Listener and Interactive    Participation Quality: Appropriate, Attentive and Sharing      Speech:  normal      Thought Process/Content: Logical      Affective Functioning: Congruent      Mood: euthymic      Level of consciousness:  Alert and Attentive      Response to Learning: Able to verbalize current knowledge/experience, Able to retain information and Progressing to goal      Endings: None Reported    Modes of Intervention: Education, Support, Socialization, Clarifying and Reality-testing      Discipline Responsible: Psychoeducational Specialist      Signature:  Sixto Shah

## 2020-10-07 NOTE — GROUP NOTE
Group Therapy Note    Date: 10/7/2020    Group Start Time: 1000  Group End Time: 1871  Group Topic: Psychotherapy    VINNY GUARDADO    CORINE Dickson, GLADYSW        Group Therapy Note    Attendees: 8/14         Patient's Goal: Interpersonal relationships and communication    Notes:  Sharing/supportive     Status After Intervention:  Improved    Participation Level: Interactive    Participation Quality: Appropriate, Attentive, Sharing and Supportive      Speech:  normal      Thought Process/Content: Logical  Linear      Affective Functioning: Congruent      Mood: euthymic      Level of consciousness:  Alert, Oriented x4 and Attentive      Response to Learning: Able to verbalize current knowledge/experience, Capable of insight and Able to change behavior      Endings: None Reported    Modes of Intervention: Support      Discipline Responsible: /Counselor      Signature:   CORINE Dickson, EBONY

## 2020-10-07 NOTE — GROUP NOTE
Group Therapy Note    Date: 10/7/2020    Group Start Time: 1330  Group End Time: 9144  Group Topic: Music Therapy    VINNY GUARDADO    Judeen Klinefelter        Group Therapy Note    Attendees: 8/15       Patient's Goal:  Patient selected preferred music and related their song choice to a mental health related quote. Notes:  Patient attended and participated in group, singing along quietly to music and engaging conversations related to songs. Patient selected the song Numb by [facility] and related it to a quote about anger. Patient had positive interactions talking to peers and this writer during this topic, and was an active listener throughout the rest of group. Status After Intervention:  Improved    Participation Level:  Active Listener and Interactive    Participation Quality: Appropriate, Attentive and Sharing      Speech:  normal      Thought Process/Content: Logical  Linear      Affective Functioning: Congruent      Mood: euthymic      Level of consciousness:  Alert and Attentive      Response to Learning: Able to verbalize current knowledge/experience and Progressing to goal      Endings: None Reported    Modes of Intervention: Education, Support, Exploration, Clarifying, Activity, Media and Reality-testing      Discipline Responsible: Psychoeducational Specialist      Signature:  Judeen Klinefelter

## 2020-10-07 NOTE — GROUP NOTE
Group Therapy Note    Date: 10/7/2020    Group Start Time: 1430  Group End Time: 3047  Group Topic: Cognitive Skills    VINNY Abreu        Group Therapy Note    Attendees: 9/15    Pt did not attend RT skills group d/t resting in room despite staff invitation to attend. 1:1 talk time offered as alternative to group session, pt declined.

## 2020-10-08 LAB
ABSOLUTE EOS #: 0.2 K/UL (ref 0–0.4)
ABSOLUTE IMMATURE GRANULOCYTE: ABNORMAL K/UL (ref 0–0.3)
ABSOLUTE LYMPH #: 2.6 K/UL (ref 1–4.8)
ABSOLUTE MONO #: 0.9 K/UL (ref 0.1–1.3)
ALBUMIN SERPL-MCNC: 4.4 G/DL (ref 3.5–5.2)
ALBUMIN/GLOBULIN RATIO: ABNORMAL (ref 1–2.5)
ALP BLD-CCNC: 70 U/L (ref 40–129)
ALT SERPL-CCNC: 9 U/L (ref 5–41)
ANION GAP SERPL CALCULATED.3IONS-SCNC: 7 MMOL/L (ref 9–17)
AST SERPL-CCNC: 13 U/L
BASOPHILS # BLD: 0 % (ref 0–2)
BASOPHILS ABSOLUTE: 0 K/UL (ref 0–0.2)
BILIRUB SERPL-MCNC: 0.63 MG/DL (ref 0.3–1.2)
BUN BLDV-MCNC: 16 MG/DL (ref 6–20)
BUN/CREAT BLD: ABNORMAL (ref 9–20)
CALCIUM SERPL-MCNC: 9.9 MG/DL (ref 8.6–10.4)
CHLORIDE BLD-SCNC: 107 MMOL/L (ref 98–107)
CHOLESTEROL/HDL RATIO: 3.4
CHOLESTEROL: 168 MG/DL
CO2: 29 MMOL/L (ref 20–31)
CREAT SERPL-MCNC: 0.86 MG/DL (ref 0.7–1.2)
DIFFERENTIAL TYPE: ABNORMAL
EOSINOPHILS RELATIVE PERCENT: 2 % (ref 0–4)
GFR AFRICAN AMERICAN: >60 ML/MIN
GFR NON-AFRICAN AMERICAN: >60 ML/MIN
GFR SERPL CREATININE-BSD FRML MDRD: ABNORMAL ML/MIN/{1.73_M2}
GFR SERPL CREATININE-BSD FRML MDRD: ABNORMAL ML/MIN/{1.73_M2}
GLUCOSE BLD-MCNC: 86 MG/DL (ref 70–99)
HCT VFR BLD CALC: 43.1 % (ref 41–53)
HDLC SERPL-MCNC: 49 MG/DL
HEMOGLOBIN: 14.2 G/DL (ref 13.5–17.5)
IMMATURE GRANULOCYTES: ABNORMAL %
LDL CHOLESTEROL: 105 MG/DL (ref 0–130)
LYMPHOCYTES # BLD: 30 % (ref 24–44)
MCH RBC QN AUTO: 28.8 PG (ref 26–34)
MCHC RBC AUTO-ENTMCNC: 32.9 G/DL (ref 31–37)
MCV RBC AUTO: 87.7 FL (ref 80–100)
MONOCYTES # BLD: 11 % (ref 1–7)
NRBC AUTOMATED: ABNORMAL PER 100 WBC
PDW BLD-RTO: 14 % (ref 11.5–14.9)
PLATELET # BLD: 283 K/UL (ref 150–450)
PLATELET ESTIMATE: ABNORMAL
PMV BLD AUTO: 8.2 FL (ref 6–12)
POTASSIUM SERPL-SCNC: 4.9 MMOL/L (ref 3.7–5.3)
RBC # BLD: 4.91 M/UL (ref 4.5–5.9)
RBC # BLD: ABNORMAL 10*6/UL
SEG NEUTROPHILS: 57 % (ref 36–66)
SEGMENTED NEUTROPHILS ABSOLUTE COUNT: 4.9 K/UL (ref 1.3–9.1)
SODIUM BLD-SCNC: 143 MMOL/L (ref 135–144)
TOTAL PROTEIN: 6.9 G/DL (ref 6.4–8.3)
TRIGL SERPL-MCNC: 69 MG/DL
TSH SERPL DL<=0.05 MIU/L-ACNC: 1.34 MIU/L (ref 0.3–5)
VLDLC SERPL CALC-MCNC: NORMAL MG/DL (ref 1–30)
WBC # BLD: 8.5 K/UL (ref 3.5–11)
WBC # BLD: ABNORMAL 10*3/UL

## 2020-10-08 PROCEDURE — 6370000000 HC RX 637 (ALT 250 FOR IP): Performed by: PSYCHIATRY & NEUROLOGY

## 2020-10-08 PROCEDURE — 80053 COMPREHEN METABOLIC PANEL: CPT

## 2020-10-08 PROCEDURE — 85025 COMPLETE CBC W/AUTO DIFF WBC: CPT

## 2020-10-08 PROCEDURE — 6370000000 HC RX 637 (ALT 250 FOR IP): Performed by: NURSE PRACTITIONER

## 2020-10-08 PROCEDURE — 84443 ASSAY THYROID STIM HORMONE: CPT

## 2020-10-08 PROCEDURE — 99232 SBSQ HOSP IP/OBS MODERATE 35: CPT | Performed by: PSYCHIATRY & NEUROLOGY

## 2020-10-08 PROCEDURE — 36415 COLL VENOUS BLD VENIPUNCTURE: CPT

## 2020-10-08 PROCEDURE — 1240000000 HC EMOTIONAL WELLNESS R&B

## 2020-10-08 PROCEDURE — 80061 LIPID PANEL: CPT

## 2020-10-08 RX ADMIN — OLANZAPINE 5 MG: 10 TABLET, ORALLY DISINTEGRATING ORAL at 21:30

## 2020-10-08 RX ADMIN — HYDROXYZINE HYDROCHLORIDE 50 MG: 50 TABLET, FILM COATED ORAL at 21:29

## 2020-10-08 RX ADMIN — TRAZODONE HYDROCHLORIDE 50 MG: 50 TABLET ORAL at 21:29

## 2020-10-08 RX ADMIN — FLUOXETINE 10 MG: 10 CAPSULE ORAL at 08:06

## 2020-10-08 NOTE — GROUP NOTE
Group Therapy Note    Date: 10/8/2020    Group Start Time: 1000  Group End Time: 3266  Group Topic: Psychoeducation    VINNY Guillen, [facility]        Group Therapy Note    Attendees: 8/16      Pt did not attend RT skills group d/t resting in room despite staff invitation to attend. 1:1 talk time offered as alternative to group session, pt declined.

## 2020-10-08 NOTE — PLAN OF CARE
Problem: Altered Mood, Depressive Behavior:  Goal: Able to verbalize and/or display a decrease in depressive symptoms  Description: Able to verbalize and/or display a decrease in depressive symptoms  10/7/2020 2131 by Renetta Smiley  Outcome: Ongoing   Coop. With vitals taken. Remains quiet guarded aloof et asocial, seclusive to room at long intervals resting on bed. Refuses offer of all unit groups et unit activities. Nourishment taken. Refuses offer of 1;1. Problem: Altered Mood, Depressive Behavior:  Goal: Ability to disclose and discuss suicidal ideas will improve  Description: Ability to disclose and discuss suicidal ideas will improve  10/7/2020 2131 by Renetta Smiley  Outcome: Ongoing   Relates of fleeting thoughts of self harm. Able to contract for safety. Problem: Altered Mood, Depressive Behavior:  Goal: Absence of self-harm  Description: Absence of self-harm  10/7/2020 2131 by Renetta Smiley  Outcome: Ongoing   No thoughts of self harm. Able to contract for safety.

## 2020-10-08 NOTE — PROGRESS NOTES
improving  Medication side effects:  denies       Data   height is 5' 10\" (1.778 m) and weight is 157 lb (71.2 kg). His oral temperature is 97.9 °F (36.6 °C). His blood pressure is 115/74 and his pulse is 90. His respiration is 16 and oxygen saturation is 100%. Labs:   Admission on 10/05/2020   Component Date Value Ref Range Status    SARS-CoV-2 10/06/2020 Not Detected  Not Detected Final    Comment:       The specimen is NEGATIVE for SARS-CoV-2, the novel coronavirus associated with COVID-19. A negative result does not rule out COVID-19. This test has been authorized by the FDA under an Emergency Use Authorization (EUA) for use   by authorized laboratories. [employer] SARS-CoV-2 Reagents for [employer] System are designed to detect the virus that causes   COVID-19 in patients with signs and symptoms of infection who are suspected of COVID-19. An individual without symptoms of COVID-19 and who is not shedding SARS-CoV-2 virus would   expect to have a negative (not detected) result in this assay. Fact sheet for Healthcare Providers: Gunnar.es  Fact sheet for Patients: Gunnar.es        METHODOLOGY: RT-PCR      SARS-CoV-2, Rapid 10/06/2020        Final    Source 10/06/2020 . NASOPHARYNGEAL SWAB   Final    SARS-CoV-2 10/06/2020        Final    Amphetamine Screen, Ur 10/06/2020 NEGATIVE  NEGATIVE Final    Comment:       (Positive cutoff 1000 ng/mL)                  Barbiturate Screen, Ur 10/06/2020 NEGATIVE  NEGATIVE Final    Comment:       (Positive cutoff 200 ng/mL)                  Benzodiazepine Screen, Urine 10/06/2020 NEGATIVE  NEGATIVE Final    Comment:       (Positive cutoff 200 ng/mL)                  Cocaine Metabolite, Urine 10/06/2020 NEGATIVE  NEGATIVE Final    Comment:       (Positive cutoff 300 ng/mL)                  Methadone Screen, Urine 10/06/2020 NEGATIVE  NEGATIVE Final    Comment:       (Positive cutoff 300 ng/mL)  Opiates, Urine 10/06/2020 POSITIVE* NEGATIVE Final    Comment:       (Positive cutoff 300 ng/mL)                  Phencyclidine, Urine 10/06/2020 NEGATIVE  NEGATIVE Final    Comment:       (Positive cutoff 25 ng/mL)                  Propoxyphene, Urine 10/06/2020 NOT REPORTED  NEGATIVE Final    Cannabinoid Scrn, Ur 10/06/2020 NEGATIVE  NEGATIVE Final    Comment:       (Positive cutoff 50 ng/mL)                  Oxycodone Screen, Ur 10/06/2020 NEGATIVE  NEGATIVE Final    Comment:       (Positive cutoff 100 ng/mL)                  Methamphetamine, Urine 10/06/2020 NOT REPORTED  NEGATIVE Final    Tricyclic Antidepressants, Urine 10/06/2020 NOT REPORTED  NEGATIVE Final    MDMA, Urine 10/06/2020 NOT REPORTED  NEGATIVE Final    Buprenorphine Urine 10/06/2020 NOT REPORTED  NEGATIVE Final    Test Information 10/06/2020 Assay provides medical screening only. The absence of expected drug(s) and/or metabolite(s) may indicate diluted or adulterated urine, limitations of testing or timing of collection. Final    Comment: Testing for legal purposes should be confirmed by another method. To request confirmation   of test result, please call the lab within 7 days of sample submission.       WBC 10/08/2020 8.5  3.5 - 11.0 k/uL Final    RBC 10/08/2020 4.91  4.5 - 5.9 m/uL Final    Hemoglobin 10/08/2020 14.2  13.5 - 17.5 g/dL Final    Hematocrit 10/08/2020 43.1  41 - 53 % Final    MCV 10/08/2020 87.7  80 - 100 fL Final    MCH 10/08/2020 28.8  26 - 34 pg Final    MCHC 10/08/2020 32.9  31 - 37 g/dL Final    RDW 10/08/2020 14.0  11.5 - 14.9 % Final    Platelets 59/52/2899 283  150 - 450 k/uL Final    MPV 10/08/2020 8.2  6.0 - 12.0 fL Final    NRBC Automated 10/08/2020 NOT REPORTED  per 100 WBC Final    Differential Type 10/08/2020 NOT REPORTED   Final    Seg Neutrophils 10/08/2020 57  36 - 66 % Final    Lymphocytes 10/08/2020 30  24 - 44 % Final    Monocytes 10/08/2020 11* 1 - 7 % Final    Eosinophils % 10/08/2020 2  0 - 4 % Final    Basophils 10/08/2020 0  0 - 2 % Final    Immature Granulocytes 10/08/2020 NOT REPORTED  0 % Final    Segs Absolute 10/08/2020 4.90  1.3 - 9.1 k/uL Final    Absolute Lymph # 10/08/2020 2.60  1.0 - 4.8 k/uL Final    Absolute Mono # 10/08/2020 0.90  0.1 - 1.3 k/uL Final    Absolute Eos # 10/08/2020 0.20  0.0 - 0.4 k/uL Final    Basophils Absolute 10/08/2020 0.00  0.0 - 0.2 k/uL Final    Absolute Immature Granulocyte 10/08/2020 NOT REPORTED  0.00 - 0.30 k/uL Final    WBC Morphology 10/08/2020 NOT REPORTED   Final    RBC Morphology 10/08/2020 NOT REPORTED   Final    Platelet Estimate 02/99/5804 NOT REPORTED   Final    Glucose 10/08/2020 86  70 - 99 mg/dL Final    BUN 10/08/2020 16  6 - 20 mg/dL Final    CREATININE 10/08/2020 0.86  0.70 - 1.20 mg/dL Final    Bun/Cre Ratio 10/08/2020 NOT REPORTED  9 - 20 Final    Calcium 10/08/2020 9.9  8.6 - 10.4 mg/dL Final    Sodium 10/08/2020 143  135 - 144 mmol/L Final    Potassium 10/08/2020 4.9  3.7 - 5.3 mmol/L Final    Chloride 10/08/2020 107  98 - 107 mmol/L Final    CO2 10/08/2020 29  20 - 31 mmol/L Final    Anion Gap 10/08/2020 7* 9 - 17 mmol/L Final    Alkaline Phosphatase 10/08/2020 70  40 - 129 U/L Final    ALT 10/08/2020 9  5 - 41 U/L Final    AST 10/08/2020 13  <40 U/L Final    Total Bilirubin 10/08/2020 0.63  0.3 - 1.2 mg/dL Final    Total Protein 10/08/2020 6.9  6.4 - 8.3 g/dL Final    Alb 10/08/2020 4.4  3.5 - 5.2 g/dL Final    Albumin/Globulin Ratio 10/08/2020 NOT REPORTED  1.0 - 2.5 Final    GFR Non- 10/08/2020 >60  >60 mL/min Final    GFR  10/08/2020 >60  >60 mL/min Final    GFR Comment 10/08/2020        Final    Comment: Average GFR for 30-36 years old:   80 mL/min/1.73sq m  Chronic Kidney Disease:   <60 mL/min/1.73sq m  Kidney failure:   <15 mL/min/1.73sq m              eGFR calculated using average adult body mass.  Additional eGFR calculator available at: [employer]Well[employer].br            GFR Staging 10/08/2020 NOT REPORTED   Final    TSH 10/08/2020 1.34  0.30 - 5.00 mIU/L Final    Cholesterol 10/08/2020 168  <200 mg/dL Final    Comment:    Cholesterol Guidelines:      <200  Desirable   200-240  Borderline      >240  Undesirable         HDL 10/08/2020 49  >40 mg/dL Final    Comment:    HDL Guidelines:    <40     Undesirable   40-59    Borderline    >59     Desirable         LDL Cholesterol 10/08/2020 105  0 - 130 mg/dL Final    Comment:    LDL Guidelines:     <100    Desirable   100-129   Near to/above Desirable   130-159   Borderline      >159   Undesirable     Direct (measured) LDL and calculated LDL are not interchangeable tests.  Chol/HDL Ratio 10/08/2020 3.4  <5 Final            Triglycerides 10/08/2020 69  <150 mg/dL Final    Comment:    Triglyceride Guidelines:     <150   Desirable   150-199  Borderline   200-499  High     >499   Very high   Based on AHA Guidelines for fasting triglyceride, October 2012.  VLDL 10/08/2020 NOT REPORTED  1 - 30 mg/dL Final            Medications  Current Facility-Administered Medications: FLUoxetine (PROZAC) capsule 10 mg, 10 mg, Oral, Daily  OLANZapine zydis (ZYPREXA) disintegrating tablet 5 mg, 5 mg, Oral, Nightly  acetaminophen (TYLENOL) tablet 650 mg, 650 mg, Oral, Q4H PRN  aluminum & magnesium hydroxide-simethicone (MAALOX) 200-200-20 MG/5ML suspension 30 mL, 30 mL, Oral, Q6H PRN  hydrOXYzine (ATARAX) tablet 50 mg, 50 mg, Oral, TID PRN  ibuprofen (ADVIL;MOTRIN) tablet 400 mg, 400 mg, Oral, Q6H PRN  nicotine (NICODERM CQ) 14 MG/24HR 1 patch, 1 patch, Transdermal, Daily  polyethylene glycol (GLYCOLAX) packet 17 g, 17 g, Oral, Daily PRN  traZODone (DESYREL) tablet 50 mg, 50 mg, Oral, Nightly PRN    ASSESSMENT    Bipolar disorder, depressed.     PLAN  Patient s symptoms   improving  Discussed plan with Dr. Wili Carmen medications  Reviewed labs with patient no concerns  Attempt to

## 2020-10-08 NOTE — GROUP NOTE
Group Therapy Note    Date: 10/8/2020    Group Start Time: 1100  Group End Time: 8254  Group Topic: Psychotherapy    VINNY Narayan LPC        Group Therapy Note    Patient declined to attend Psychotherapy group at 1100 am despite encouragement. Patient was offered a 1:1 time to meet after group or alternative activity to do and patient refused.      Signature:  Antonio Narayan [facility], [facility], LPC

## 2020-10-08 NOTE — PROGRESS NOTES
Charting done this shift reviewed by Electronically signed by Kaila Burger RN on 10/8/2020 at 12:06 AM

## 2020-10-08 NOTE — PLAN OF CARE
Problem: Altered Mood, Depressive Behavior:  Goal: Ability to disclose and discuss suicidal ideas will improve  Description: Ability to disclose and discuss suicidal ideas will improve  10/8/2020 1107 by Jordy Mccarthy RN  Outcome: Ongoing   Pt denies suicidal ideations. Pt isolates to room coming out for meals and needs only. Pt is disheveled and encouraged to trend to hygiene and ADL's. Problem: Altered Mood, Depressive Behavior:  Goal: Absence of self-harm  Description: Absence of self-harm  10/8/2020 1107 by Jordy Mccarthy RN  Outcome: Ongoing   Pt denies thoughts of self harm and is agreeable to seeking out should thoughts of self harm arise. Safe environment maintained. Q15 minute checks for safety cont per unit policy. Will cont to monitor for safety and provides support and reassurance as needed.

## 2020-10-08 NOTE — GROUP NOTE
Group Therapy Note    Date: 10/8/2020    Group Start Time: 1330  Group End Time: 3549  Group Topic: Psychoeducation    VINNY Shelton, CTRS    Patient refused to attend Psychoeducation Group at 1330 after encouragement from staff. 1:1 talk time offered.     Signature:  Valdo Valenzuela

## 2020-10-08 NOTE — GROUP NOTE
Group Therapy Note    Date: 10/8/2020    Group Start Time: 9513  Group End Time: 9680  Group Topic: Healthy Living/Wellness    VINNY GUARDADO    Blair Redmond        Group Therapy Note    Attendees: 10/14     Patient's Goal:  Watch video and discuss with group    Notes:  JOSE talk- How outside events effect our mental health    Status After Intervention:  Unchanged    Participation Level: Active Listener and Interactive    Participation Quality: Appropriate and Attentive      Speech:  normal      Thought Process/Content: Logical      Affective Functioning: Congruent      Mood: stable      Level of consciousness:  Alert and Attentive      Response to Learning: Able to verbalize current knowledge/experience and Progressing to goal      Endings: None Reported    Modes of Intervention: Education and Media      Discipline Responsible: Behavorial Health Tech      Signature:   Alexandre Reddy

## 2020-10-09 VITALS
SYSTOLIC BLOOD PRESSURE: 129 MMHG | OXYGEN SATURATION: 100 % | RESPIRATION RATE: 14 BRPM | WEIGHT: 157 LBS | HEART RATE: 68 BPM | DIASTOLIC BLOOD PRESSURE: 57 MMHG | TEMPERATURE: 98 F | HEIGHT: 70 IN | BODY MASS INDEX: 22.48 KG/M2

## 2020-10-09 PROCEDURE — 6370000000 HC RX 637 (ALT 250 FOR IP): Performed by: PSYCHIATRY & NEUROLOGY

## 2020-10-09 PROCEDURE — 6370000000 HC RX 637 (ALT 250 FOR IP): Performed by: NURSE PRACTITIONER

## 2020-10-09 PROCEDURE — 99239 HOSP IP/OBS DSCHRG MGMT >30: CPT | Performed by: PSYCHIATRY & NEUROLOGY

## 2020-10-09 RX ORDER — FLUOXETINE 10 MG/1
10 CAPSULE ORAL DAILY
Qty: 30 CAPSULE | Refills: 3 | Status: SHIPPED | OUTPATIENT
Start: 2020-10-10

## 2020-10-09 RX ORDER — TRAZODONE HYDROCHLORIDE 50 MG/1
50 TABLET ORAL NIGHTLY PRN
Qty: 30 TABLET | Refills: 0 | Status: SHIPPED | OUTPATIENT
Start: 2020-10-09

## 2020-10-09 RX ORDER — OLANZAPINE 5 MG/1
5 TABLET ORAL NIGHTLY
Qty: 30 TABLET | Refills: 3 | Status: SHIPPED | OUTPATIENT
Start: 2020-10-09

## 2020-10-09 RX ADMIN — FLUOXETINE 10 MG: 10 CAPSULE ORAL at 08:13

## 2020-10-09 RX ADMIN — IBUPROFEN 400 MG: 400 TABLET, FILM COATED ORAL at 10:37

## 2020-10-09 ASSESSMENT — PAIN SCALES - GENERAL: PAINLEVEL_OUTOF10: 7

## 2020-10-09 NOTE — GROUP NOTE
Group Therapy Note    Date: 10/9/2020    Group Start Time: 1330  Group End Time: 8088  Group Topic: Cognitive Skills    VINNY Wei, CTRS    Patient refused to attend Recreational Therapy Group at 1330 after encouragement from staff. 1:1 talk time offered.     Signature:  Michell Tracey

## 2020-10-09 NOTE — GROUP NOTE
Group Therapy Note    Date: 10/9/2020    Group Start Time: 1000  Group End Time: 9993  Group Topic: Recreational    CZ SOL Mariee Reddish, [place]    Attendees: 6         Patient's Goal:  To demonstrate increased interpersonal skills. Notes:  Patient joined group late for the last 15 minutes. Patient did not actively participate in task at hand, but chose to sit and observe. Patient conversed appropriately with peers and Gustav Bumpers. Status After Intervention:  Improved    Participation Level:  Active Listener    Participation Quality: Appropriate and Attentive      Speech:  normal      Thought Process/Content: Logical      Affective Functioning: Congruent      Mood: euthymic      Level of consciousness:  Alert, Oriented x4 and Attentive      Response to Learning: Progressing to goal      Endings: None Reported       Modes of Intervention: Socialization, Exploration, Clarifying, Problem-solving, Activity, Confrontation, Limit-setting and Reality-testing      Discipline Responsible: Psychoeducational Specialist      Signature:  Radha Covarrubias

## 2020-10-09 NOTE — SUICIDE SAFETY PLAN
SAFETY PLAN    A suicide Safety Plan is a document that supports someone when they are having thoughts of suicide. Warning Signs that indicate a suicidal crisis may be developing: What (situations, thoughts, feelings, body sensations, behaviors, etc.) do you experience that lets you know you are beginning to think about suicide? 1. Increased anger  2. Increased depression   3. Thoughts of harming myself     Internal Coping Strategies:  What things can I do (relaxation techniques, hobbies, physical activities, etc.) to take my mind off my problems without contacting another person? 1. Work on motorcycle   2. Yard work  3. listen to [employer] and social settings that provide distraction: Who can I call or where can I go to distract me? 1. Name: Mom  Phone: [phone]  2. Name: Hazel  Phone: [phone]   3. Place: Go to the water, [employer]: [employer]: [employer] whom I can ask for help: Who can I call when I need help - for example, friends, family, clergy, someone else? 1. Name: Mom                Phone: [phone]  2. Name: Sister  Phone: Benji Camp  3. Name: [facility]  Phone: [phone]    Professionals or [address] [place] [address] I can contact during a crisis: Who can I call for help - for example, my doctor, my psychiatrist, my psychologist, a mental health provider, a suicide hotline? 1. Clinician Name: [facility]    Phone: [phone]      Clinician Pager or Emergency Contact #: NA    2. Clinician Name: NA   Phone: NA      Clinician Pager or Emergency Contact #: NA    3. Suicide Prevention Lifeline: 8-113-759-TALK (1082)    4. [address] [place] [address] Emergency Services -  for example, [facility] suicide hotline, [address] [place] [address] Avenue: [address]      Emergency Services Address: [facility], [address] [place] [address]       Emergency Services Phone: [phone]    Making the environment safe:  How can I make my environment (house/apartment/living space) safer? For example, can I remove guns, medications, and other items? 1. Eliminate all sharp objects   2.  Have more pictures of my kids

## 2020-10-09 NOTE — GROUP NOTE
Group Therapy Note    Date: 10/9/2020    Group Start Time: 1100  Group End Time: 6304  Group Topic: Psychotherapy    [facility]    CORINE Erickson LSW        Group Therapy Note    Attendees: 9/14         Patient's Goal: Interpersonal relationships and communication     Notes:  Sharing/supportive      Status After Intervention:  Improved    Participation Level: Interactive    Participation Quality: Appropriate, Attentive, Sharing and Supportive      Speech:  normal      Thought Process/Content: Logical  Linear      Affective Functioning: Congruent      Mood: euthymic      Level of consciousness:  Alert, Oriented x4 and Attentive      Response to Learning: Able to verbalize/acknowledge new learning, Able to retain information, Capable of insight, Able to change behavior and Progressing to goal      Endings: None Reported    Modes of Intervention: Support      Discipline Responsible: /Counselor      Signature:   CORINE Erickson LSW
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
## 2020-10-09 NOTE — BH NOTE
Patient given tobacco quitline number 03788977467 at this time, refusing to call at this time, states \" I just dont want to quit now\"- patient given information as to the dangers of long term tobacco use. Continue to reinforce the importance of tobacco cessation.

## 2020-10-09 NOTE — BH NOTE
[address] [facility]  Discharge Note    Pt discharged with followings belongings:   Dentures: None  Vision - Corrective Lenses: None  Hearing Aid: None  Jewelry: Earrings, Other (Comment)(1 clear stoned earring; Tongue piercing)  Body Piercings Removed: No  Clothing: Socks, Pajamas, Shirt, Footwear, Pants, Jacket / coat  Were All Patient Medications Collected?: Not Applicable  Other Valuables: Keys, Other (Comment), Wallet(Lighter;$5;[employer]-[id number]; Social Secuirty Card; Drivers license)   Valuables sent home with patient. Valuables retrieved from safe, Security envelope number:  [id number] and returned to patient. Patient education on aftercare instructions: yes  Information faxed to [facility] by RN Patient verbalize understanding of AVS:  yes. Status EXAM upon discharge:  Status and Exam  Normal: No  Facial Expression: Flat  Affect: Appropriate  Level of Consciousness: Alert  Mood:Normal: No  Mood: Depressed, Anxious  Motor Activity:Normal: Yes  Motor Activity: Decreased  Interview Behavior: Cooperative  Preception: [place] to Person, [place] to Time, [place] to Place, [place] to Situation  Attention:Normal: Yes  Attention: Distractible  Thought Processes: Circumstantial  Thought Content:Normal: Yes  Thought Content: Poverty of Content  Hallucinations: None  Delusions: No  Memory:Normal: Yes  Memory: Poor Recent  Insight and Judgment: No  Insight and Judgment: Poor Judgment, Poor Insight  Present Suicidal Ideation: No  Present Homicidal Ideation: No      Metabolic Screening:    No results found for: LABA1C    Lab Results   Component Value Date    CHOL 168 10/08/2020     Lab Results   Component Value Date    TRIG 69 10/08/2020     Lab Results   Component Value Date    HDL 49 10/08/2020     No components found for: LDLCAL  No results found for: LABVLDL  Patient transported home by mother.   Clint Astudillo RN

## 2020-10-09 NOTE — PLAN OF CARE
Problem: Altered Mood, Depressive Behavior:  Goal: Able to verbalize and/or display a decrease in depressive symptoms  Description: Able to verbalize and/or display a decrease in depressive symptoms  10/8/2020 2242 by Mile Davila RN  Outcome: Ongoing   Pt denies depressive symptoms at this time. Pt is hopeful to be discharged tomorrow. Problem: Altered Mood, Depressive Behavior:  Goal: Ability to disclose and discuss suicidal ideas will improve  Description: Ability to disclose and discuss suicidal ideas will improve  10/8/2020 2242 by Mile Davila RN  Outcome: Ongoing   Pt denies suicidal thoughts at this time. Problem: Altered Mood, Depressive Behavior:  Goal: Absence of self-harm  Description: Absence of self-harm  10/8/2020 2242 by Mile Davila RN  Outcome: Ongoing   No self harm this shift.    Problem: Tobacco Use:  Goal: Inpatient tobacco use cessation counseling participation  Description: Inpatient tobacco use cessation counseling participation  10/8/2020 2242 by Mile Davila RN  Outcome: Ongoing   Pt declines

## 2020-10-09 NOTE — PROGRESS NOTES
CLINICAL PHARMACY NOTE: MEDS TO [address] Select Patient?: No  Total # of Prescriptions Filled: 3   The following medications were delivered to the patient:  · Fluoxetine  · Trazodone  · Olanzapine  ·   Total # of Interventions Completed: 0  Time Spent (min): 30    Additional Documentation:

## 2020-10-29 ENCOUNTER — APPOINTMENT (OUTPATIENT)
Dept: CT IMAGING | Age: 30
End: 2020-10-29
Payer: COMMERCIAL

## 2020-10-29 ENCOUNTER — APPOINTMENT (OUTPATIENT)
Dept: GENERAL RADIOLOGY | Age: 30
End: 2020-10-29
Payer: COMMERCIAL

## 2020-10-29 ENCOUNTER — HOSPITAL ENCOUNTER (EMERGENCY)
Age: 30
Discharge: HOME | End: 2020-10-29
Attending: EMERGENCY MEDICINE
Payer: COMMERCIAL

## 2020-10-29 LAB
ABSOLUTE EOS #: 0.1 K/UL (ref 0–0.4)
ABSOLUTE IMMATURE GRANULOCYTE: ABNORMAL K/UL (ref 0–0.3)
ABSOLUTE LYMPH #: 2.4 K/UL (ref 1–4.8)
ABSOLUTE MONO #: 0.7 K/UL (ref 0.1–1.3)
ALBUMIN SERPL-MCNC: 4.6 G/DL (ref 3.5–5.2)
ALBUMIN/GLOBULIN RATIO: NORMAL (ref 1–2.5)
ALP BLD-CCNC: 80 U/L (ref 40–129)
ALT SERPL-CCNC: 16 U/L (ref 5–41)
ANION GAP SERPL CALCULATED.3IONS-SCNC: 11 MMOL/L (ref 9–17)
AST SERPL-CCNC: 19 U/L
BASOPHILS # BLD: 0 % (ref 0–2)
BASOPHILS ABSOLUTE: 0 K/UL (ref 0–0.2)
BILIRUB SERPL-MCNC: 0.34 MG/DL (ref 0.3–1.2)
BUN BLDV-MCNC: 11 MG/DL (ref 6–20)
BUN/CREAT BLD: NORMAL (ref 9–20)
CALCIUM SERPL-MCNC: 9.5 MG/DL (ref 8.6–10.4)
CHLORIDE BLD-SCNC: 105 MMOL/L (ref 98–107)
CO2: 26 MMOL/L (ref 20–31)
CREAT SERPL-MCNC: 0.89 MG/DL (ref 0.7–1.2)
D-DIMER QUANTITATIVE: 0.89 MG/L FEU (ref 0–0.59)
DIFFERENTIAL TYPE: ABNORMAL
EOSINOPHILS RELATIVE PERCENT: 2 % (ref 0–4)
GFR AFRICAN AMERICAN: >60 ML/MIN
GFR NON-AFRICAN AMERICAN: >60 ML/MIN
GFR SERPL CREATININE-BSD FRML MDRD: NORMAL ML/MIN/{1.73_M2}
GFR SERPL CREATININE-BSD FRML MDRD: NORMAL ML/MIN/{1.73_M2}
GLUCOSE BLD-MCNC: 99 MG/DL (ref 70–99)
HCT VFR BLD CALC: 41.2 % (ref 41–53)
HEMOGLOBIN: 13.8 G/DL (ref 13.5–17.5)
IMMATURE GRANULOCYTES: ABNORMAL %
INR BLD: 1
LYMPHOCYTES # BLD: 34 % (ref 24–44)
MCH RBC QN AUTO: 29.2 PG (ref 26–34)
MCHC RBC AUTO-ENTMCNC: 33.6 G/DL (ref 31–37)
MCV RBC AUTO: 87 FL (ref 80–100)
MONOCYTES # BLD: 10 % (ref 1–7)
NRBC AUTOMATED: ABNORMAL PER 100 WBC
PARTIAL THROMBOPLASTIN TIME: 28.2 SEC (ref 24–36)
PDW BLD-RTO: 14.1 % (ref 11.5–14.9)
PLATELET # BLD: 270 K/UL (ref 150–450)
PLATELET ESTIMATE: ABNORMAL
PMV BLD AUTO: 8 FL (ref 6–12)
POTASSIUM SERPL-SCNC: 3.8 MMOL/L (ref 3.7–5.3)
PROTHROMBIN TIME: 12.9 SEC (ref 11.8–14.6)
RBC # BLD: 4.73 M/UL (ref 4.5–5.9)
RBC # BLD: ABNORMAL 10*6/UL
SEG NEUTROPHILS: 54 % (ref 36–66)
SEGMENTED NEUTROPHILS ABSOLUTE COUNT: 3.7 K/UL (ref 1.3–9.1)
SODIUM BLD-SCNC: 142 MMOL/L (ref 135–144)
TOTAL PROTEIN: 7.1 G/DL (ref 6.4–8.3)
TROPONIN INTERP: NORMAL
TROPONIN T: NORMAL NG/ML
TROPONIN, HIGH SENSITIVITY: <6 NG/L (ref 0–22)
WBC # BLD: 6.9 K/UL (ref 3.5–11)
WBC # BLD: ABNORMAL 10*3/UL

## 2020-10-29 PROCEDURE — 80053 COMPREHEN METABOLIC PANEL: CPT

## 2020-10-29 PROCEDURE — 36415 COLL VENOUS BLD VENIPUNCTURE: CPT

## 2020-10-29 PROCEDURE — 85025 COMPLETE CBC W/AUTO DIFF WBC: CPT

## 2020-10-29 PROCEDURE — 84484 ASSAY OF TROPONIN QUANT: CPT

## 2020-10-29 PROCEDURE — 85610 PROTHROMBIN TIME: CPT

## 2020-10-29 PROCEDURE — 6360000004 HC RX CONTRAST MEDICATION: Performed by: EMERGENCY MEDICINE

## 2020-10-29 PROCEDURE — 71045 X-RAY EXAM CHEST 1 VIEW: CPT

## 2020-10-29 PROCEDURE — 96374 THER/PROPH/DIAG INJ IV PUSH: CPT

## 2020-10-29 PROCEDURE — 99284 EMERGENCY DEPT VISIT MOD MDM: CPT

## 2020-10-29 PROCEDURE — 71260 CT THORAX DX C+: CPT

## 2020-10-29 PROCEDURE — 93005 ELECTROCARDIOGRAM TRACING: CPT | Performed by: EMERGENCY MEDICINE

## 2020-10-29 PROCEDURE — 85730 THROMBOPLASTIN TIME PARTIAL: CPT

## 2020-10-29 PROCEDURE — 6360000002 HC RX W HCPCS: Performed by: EMERGENCY MEDICINE

## 2020-10-29 PROCEDURE — 2580000003 HC RX 258: Performed by: EMERGENCY MEDICINE

## 2020-10-29 PROCEDURE — 85379 FIBRIN DEGRADATION QUANT: CPT

## 2020-10-29 RX ORDER — KETOROLAC TROMETHAMINE 30 MG/ML
30 INJECTION, SOLUTION INTRAMUSCULAR; INTRAVENOUS ONCE
Status: COMPLETED | OUTPATIENT
Start: 2020-10-29 | End: 2020-10-29

## 2020-10-29 RX ORDER — SODIUM CHLORIDE 0.9 % (FLUSH) 0.9 %
10 SYRINGE (ML) INJECTION PRN
Status: DISCONTINUED | OUTPATIENT
Start: 2020-10-29 | End: 2020-10-30 | Stop reason: HOSPADM

## 2020-10-29 RX ORDER — 0.9 % SODIUM CHLORIDE 0.9 %
80 INTRAVENOUS SOLUTION INTRAVENOUS ONCE
Status: COMPLETED | OUTPATIENT
Start: 2020-10-29 | End: 2020-10-29

## 2020-10-29 RX ADMIN — IOPAMIDOL 75 ML: 755 INJECTION, SOLUTION INTRAVENOUS at 21:07

## 2020-10-29 RX ADMIN — KETOROLAC TROMETHAMINE 30 MG: 30 INJECTION, SOLUTION INTRAMUSCULAR; INTRAVENOUS at 20:25

## 2020-10-29 RX ADMIN — Medication 10 ML: at 21:07

## 2020-10-29 RX ADMIN — SODIUM CHLORIDE 80 ML: 9 INJECTION, SOLUTION INTRAVENOUS at 21:07

## 2020-10-29 ASSESSMENT — ENCOUNTER SYMPTOMS
SHORTNESS OF BREATH: 0
ABDOMINAL PAIN: 0
BACK PAIN: 0
COLOR CHANGE: 0
EYE PAIN: 0

## 2020-10-29 ASSESSMENT — PAIN SCALES - GENERAL
PAINLEVEL_OUTOF10: 10
PAINLEVEL_OUTOF10: 10

## 2020-10-29 ASSESSMENT — HEART SCORE: ECG: 0

## 2020-10-29 NOTE — ED PROVIDER NOTES
([facility].) F31.9     SURGICAL HISTORY       Past Surgical History:   Procedure Laterality Date    KNEE ARTHROSCOPY      TONSILLECTOMY       CURRENT MEDICATIONS       Discharge Medication List as of 10/29/2020 11:10 PM      CONTINUE these medications which have NOT CHANGED    Details   traZODone (DESYREL) 50 MG tablet Take 1 tablet by mouth nightly as needed for Sleep, Disp-30 tablet,R-0Normal      FLUoxetine (PROZAC) 10 MG capsule Take 1 capsule by mouth daily, Disp-30 capsule,R-3Normal      OLANZapine (ZYPREXA) 5 MG tablet Take 1 tablet by mouth nightly, Disp-30 tablet,R-3Normal           ALLERGIES     has No Known Allergies. FAMILY HISTORY     has no family status information on file. SOCIAL HISTORY       Social History     Tobacco Use    Smoking status: Current Every Day Smoker     Packs/day: 1.50     Types: Cigarettes    Smokeless tobacco: Never Used    Tobacco comment: goal to quit by christmas 2020    Substance Use Topics    Alcohol use: Not Currently    Drug use: Not Currently     PHYSICAL EXAM     INITIAL VITALS: BP (!) 137/91   Pulse 66   Temp 98.5 °F (36.9 °C) (Oral)   Resp 14   Ht 5' 10\" (1.778 m)   Wt 155 lb (70.3 kg)   SpO2 100%   BMI 22.24 kg/m²    Physical Exam  Vitals signs and nursing note reviewed. Constitutional:       Appearance: Normal appearance. HENT:      Head: Normocephalic and atraumatic. Right Ear: External ear normal.      Left Ear: External ear normal.      Nose: Nose normal.      Mouth/Throat:      Mouth: Mucous membranes are moist.   Eyes:      Pupils: Pupils are equal, round, and reactive to light. Neck:      Musculoskeletal: Neck supple. Cardiovascular:      Rate and Rhythm: Normal rate and regular rhythm. Pulses: Normal pulses. Heart sounds: Normal heart sounds. Pulmonary:      Effort: Pulmonary effort is normal.      Breath sounds: Normal breath sounds. Chest:      Chest wall: Tenderness present. No crepitus.        Abdominal:      General: Abdomen is flat. Palpations: Abdomen is soft. Tenderness: There is no abdominal tenderness. Musculoskeletal: Normal range of motion. General: No tenderness. Skin:     General: Skin is warm and dry. Capillary Refill: Capillary refill takes less than 2 seconds. Neurological:      General: No focal deficit present. Mental Status: He is alert and oriented to person, place, and time. Psychiatric:         Behavior: Behavior normal.         MEDICAL DECISION MAKINyear old male presents with complaint of right-sided chest pain. Initial exam patient no acute distress vital signs are stable, will obtain lab work and chest x-ray as well as provide patient with pain medication. Labs reviewed, ddimer noted to be elevated CT chest ordered to rule out pe    cxr reviewed and unremarkable,     Patient was signed out to oncoming physician pending results of CT scan, plan for d/c pending negative ct         CRITICAL CARE:       PROCEDURES:    Procedures    DIAGNOSTIC RESULTS   EKG:All EKG's are interpreted by the Emergency Department Physician who either signs or Co-signs this chart in the absence of a cardiologist.  EKG reviewed showing normal sinus rhythm with a rate of 60, normal axis, incomplete right bundle branch block, no significant ST segment elevation or depression, no significant T wave changes      RADIOLOGY:All plain film, CT, MRI, and formal ultrasound images (except ED bedside ultrasound) are read by the radiologist, see reports below, unless otherwisenoted in MDM or here. CT CHEST PULMONARY EMBOLISM W CONTRAST   Final Result   No evidence of pulmonary embolism or acute pulmonary abnormality. XR CHEST PORTABLE   Final Result   1. No radiographic finding to account for patient's chest pain. LABS: All lab results were reviewed by myself, and all abnormals are listed below.   Labs Reviewed   CBC WITH AUTO DIFFERENTIAL - Abnormal; Notable for the following components:       Result Value    Monocytes 10 (*)     All other components within normal limits   D-DIMER, QUANTITATIVE - Abnormal; Notable for the following components:    D-Dimer, Quant 0.89 (*)     All other components within normal limits   COMPREHENSIVE METABOLIC PANEL W/ REFLEX TO MG FOR LOW K   TROPONIN   PROTIME-INR   APTT       EMERGENCY DEPARTMENTCOURSE:         Vitals:    Vitals:    10/29/20 2230 10/29/20 2245 10/29/20 2300 10/29/20 2315   BP:       Pulse: 65 62 61 66   Resp: 21 16 20 14   Temp:       TempSrc:       SpO2: 99% 99% 98% 100%   Weight:       Height:           The patient was given the following medications while in the emergency department:  Orders Placed This Encounter   Medications    ketorolac (TORADOL) injection 30 mg    0.9 % sodium chloride bolus    DISCONTD: sodium chloride flush 0.9 % injection 10 mL    iopamidol (ISOVUE-370) 76 % injection 75 mL     CONSULTS:  None    FINAL IMPRESSION      1.  Acute chest pain          DISPOSITION/PLAN   DISPOSITION Decision To Discharge 10/29/2020 11:10:17 PM      PATIENT REFERRED TO:  KELLY TANNER [address] [id number] [phone]  Schedule an appointment as soon as possible for a visit       [facility] ED  [address]  [phone]    As needed, If symptoms worsen    DISCHARGE MEDICATIONS:  Discharge Medication List as of 10/29/2020 11:10 PM        Carl Altman DO  Attending Emergency Physician                  Carl Altman DO  10/30/20 5861

## 2020-10-30 ENCOUNTER — HOSPITAL ENCOUNTER (OUTPATIENT)
Dept: MRI IMAGING | Age: 30
Discharge: HOME | End: 2020-11-01
Payer: COMMERCIAL

## 2020-10-30 VITALS
WEIGHT: 155 LBS | SYSTOLIC BLOOD PRESSURE: 137 MMHG | RESPIRATION RATE: 14 BRPM | OXYGEN SATURATION: 100 % | BODY MASS INDEX: 22.19 KG/M2 | DIASTOLIC BLOOD PRESSURE: 91 MMHG | HEIGHT: 70 IN | HEART RATE: 66 BPM | TEMPERATURE: 98.5 F

## 2020-10-30 LAB
EKG ATRIAL RATE: 60 BPM
EKG P AXIS: 70 DEGREES
EKG P-R INTERVAL: 160 MS
EKG Q-T INTERVAL: 416 MS
EKG QRS DURATION: 110 MS
EKG QTC CALCULATION (BAZETT): 416 MS
EKG R AXIS: 60 DEGREES
EKG T AXIS: 63 DEGREES
EKG VENTRICULAR RATE: 60 BPM

## 2020-10-30 PROCEDURE — 93010 ELECTROCARDIOGRAM REPORT: CPT | Performed by: INTERNAL MEDICINE

## 2020-10-30 PROCEDURE — 72141 MRI NECK SPINE W/O DYE: CPT

## 2020-10-30 NOTE — ED NOTES
Pt discharged in stable condition. Pt advised to follow up with PCP and return to ED if symptoms worsen. Pt is AOx4 and answering questions appropriately. Skin is PWD. Pt has steady gait upon discharge.       Arlyn Turner RN  10/29/20 1747

## 2021-10-04 ENCOUNTER — APPOINTMENT (OUTPATIENT)
Dept: CT IMAGING | Age: 31
End: 2021-10-04
Payer: COMMERCIAL

## 2021-10-04 ENCOUNTER — HOSPITAL ENCOUNTER (EMERGENCY)
Age: 31
Discharge: HOME | End: 2021-10-04
Attending: EMERGENCY MEDICINE
Payer: COMMERCIAL

## 2021-10-04 VITALS
SYSTOLIC BLOOD PRESSURE: 131 MMHG | TEMPERATURE: 98.1 F | DIASTOLIC BLOOD PRESSURE: 84 MMHG | HEART RATE: 78 BPM | OXYGEN SATURATION: 100 % | RESPIRATION RATE: 18 BRPM

## 2021-10-04 DIAGNOSIS — R10.9 ABDOMINAL PAIN, UNSPECIFIED ABDOMINAL LOCATION: Primary | ICD-10-CM

## 2021-10-04 LAB
ABSOLUTE EOS #: 0.1 K/UL (ref 0–0.4)
ABSOLUTE IMMATURE GRANULOCYTE: ABNORMAL K/UL (ref 0–0.3)
ABSOLUTE LYMPH #: 3.3 K/UL (ref 1–4.8)
ABSOLUTE MONO #: 1 K/UL (ref 0.1–1.3)
ALBUMIN SERPL-MCNC: 4.8 G/DL (ref 3.5–5.2)
ALBUMIN/GLOBULIN RATIO: ABNORMAL (ref 1–2.5)
ALP BLD-CCNC: 91 U/L (ref 40–129)
ALT SERPL-CCNC: 8 U/L (ref 5–41)
ANION GAP SERPL CALCULATED.3IONS-SCNC: 10 MMOL/L (ref 9–17)
AST SERPL-CCNC: 12 U/L
BASOPHILS # BLD: 0 % (ref 0–2)
BASOPHILS ABSOLUTE: 0 K/UL (ref 0–0.2)
BILIRUB SERPL-MCNC: 0.29 MG/DL (ref 0.3–1.2)
BILIRUBIN DIRECT: 0.09 MG/DL
BILIRUBIN URINE: NEGATIVE
BILIRUBIN, INDIRECT: 0.2 MG/DL (ref 0–1)
BUN BLDV-MCNC: 13 MG/DL (ref 6–20)
BUN/CREAT BLD: NORMAL (ref 9–20)
CALCIUM SERPL-MCNC: 9.7 MG/DL (ref 8.6–10.4)
CHLORIDE BLD-SCNC: 105 MMOL/L (ref 98–107)
CO2: 27 MMOL/L (ref 20–31)
COLOR: YELLOW
COMMENT UA: ABNORMAL
CREAT SERPL-MCNC: 1.14 MG/DL (ref 0.7–1.2)
DIFFERENTIAL TYPE: ABNORMAL
EOSINOPHILS RELATIVE PERCENT: 2 % (ref 0–4)
GFR AFRICAN AMERICAN: >60 ML/MIN
GFR NON-AFRICAN AMERICAN: >60 ML/MIN
GFR SERPL CREATININE-BSD FRML MDRD: NORMAL ML/MIN/{1.73_M2}
GFR SERPL CREATININE-BSD FRML MDRD: NORMAL ML/MIN/{1.73_M2}
GLOBULIN: ABNORMAL G/DL (ref 1.5–3.8)
GLUCOSE BLD-MCNC: 95 MG/DL (ref 70–99)
GLUCOSE URINE: NEGATIVE
HCT VFR BLD CALC: 44.8 % (ref 41–53)
HEMOGLOBIN: 15.1 G/DL (ref 13.5–17.5)
IMMATURE GRANULOCYTES: ABNORMAL %
KETONES, URINE: NEGATIVE
LEUKOCYTE ESTERASE, URINE: NEGATIVE
LIPASE: 62 U/L (ref 13–60)
LYMPHOCYTES # BLD: 36 % (ref 24–44)
MAGNESIUM: 2.1 MG/DL (ref 1.6–2.6)
MCH RBC QN AUTO: 29.4 PG (ref 26–34)
MCHC RBC AUTO-ENTMCNC: 33.6 G/DL (ref 31–37)
MCV RBC AUTO: 87.3 FL (ref 80–100)
MONOCYTES # BLD: 11 % (ref 1–7)
NITRITE, URINE: NEGATIVE
NRBC AUTOMATED: ABNORMAL PER 100 WBC
PDW BLD-RTO: 14.3 % (ref 11.5–14.9)
PH UA: 6 (ref 5–8)
PLATELET # BLD: 249 K/UL (ref 150–450)
PLATELET ESTIMATE: ABNORMAL
PMV BLD AUTO: 8.6 FL (ref 6–12)
POTASSIUM SERPL-SCNC: 3.9 MMOL/L (ref 3.7–5.3)
PROTEIN UA: NEGATIVE
RBC # BLD: 5.13 M/UL (ref 4.5–5.9)
RBC # BLD: ABNORMAL 10*6/UL
SEG NEUTROPHILS: 51 % (ref 36–66)
SEGMENTED NEUTROPHILS ABSOLUTE COUNT: 4.9 K/UL (ref 1.3–9.1)
SODIUM BLD-SCNC: 142 MMOL/L (ref 135–144)
SPECIFIC GRAVITY UA: 1.06 (ref 1–1.03)
TOTAL PROTEIN: 7.7 G/DL (ref 6.4–8.3)
TURBIDITY: CLEAR
URINE HGB: NEGATIVE
UROBILINOGEN, URINE: NORMAL
WBC # BLD: 9.4 K/UL (ref 3.5–11)
WBC # BLD: ABNORMAL 10*3/UL

## 2021-10-04 PROCEDURE — 81003 URINALYSIS AUTO W/O SCOPE: CPT

## 2021-10-04 PROCEDURE — 6360000002 HC RX W HCPCS: Performed by: EMERGENCY MEDICINE

## 2021-10-04 PROCEDURE — 6360000004 HC RX CONTRAST MEDICATION: Performed by: EMERGENCY MEDICINE

## 2021-10-04 PROCEDURE — 36415 COLL VENOUS BLD VENIPUNCTURE: CPT

## 2021-10-04 PROCEDURE — 85025 COMPLETE CBC W/AUTO DIFF WBC: CPT

## 2021-10-04 PROCEDURE — 51798 US URINE CAPACITY MEASURE: CPT

## 2021-10-04 PROCEDURE — 80048 BASIC METABOLIC PNL TOTAL CA: CPT

## 2021-10-04 PROCEDURE — 80076 HEPATIC FUNCTION PANEL: CPT

## 2021-10-04 PROCEDURE — 99283 EMERGENCY DEPT VISIT LOW MDM: CPT

## 2021-10-04 PROCEDURE — 2580000003 HC RX 258: Performed by: EMERGENCY MEDICINE

## 2021-10-04 PROCEDURE — 96374 THER/PROPH/DIAG INJ IV PUSH: CPT

## 2021-10-04 PROCEDURE — 83690 ASSAY OF LIPASE: CPT

## 2021-10-04 PROCEDURE — 74177 CT ABD & PELVIS W/CONTRAST: CPT

## 2021-10-04 PROCEDURE — 83735 ASSAY OF MAGNESIUM: CPT

## 2021-10-04 RX ORDER — KETOROLAC TROMETHAMINE 30 MG/ML
30 INJECTION, SOLUTION INTRAMUSCULAR; INTRAVENOUS ONCE
Status: COMPLETED | OUTPATIENT
Start: 2021-10-04 | End: 2021-10-04

## 2021-10-04 RX ORDER — 0.9 % SODIUM CHLORIDE 0.9 %
2000 INTRAVENOUS SOLUTION INTRAVENOUS ONCE
Status: COMPLETED | OUTPATIENT
Start: 2021-10-04 | End: 2021-10-04

## 2021-10-04 RX ORDER — 0.9 % SODIUM CHLORIDE 0.9 %
80 INTRAVENOUS SOLUTION INTRAVENOUS ONCE
Status: COMPLETED | OUTPATIENT
Start: 2021-10-04 | End: 2021-10-04

## 2021-10-04 RX ORDER — SODIUM CHLORIDE 0.9 % (FLUSH) 0.9 %
10 SYRINGE (ML) INJECTION PRN
Status: DISCONTINUED | OUTPATIENT
Start: 2021-10-04 | End: 2021-10-04 | Stop reason: HOSPADM

## 2021-10-04 RX ADMIN — KETOROLAC TROMETHAMINE 30 MG: 30 INJECTION, SOLUTION INTRAMUSCULAR; INTRAVENOUS at 06:03

## 2021-10-04 RX ADMIN — SODIUM CHLORIDE 2000 ML: 9 INJECTION, SOLUTION INTRAVENOUS at 04:35

## 2021-10-04 RX ADMIN — SODIUM CHLORIDE, PRESERVATIVE FREE 10 ML: 5 INJECTION INTRAVENOUS at 05:13

## 2021-10-04 RX ADMIN — SODIUM CHLORIDE 80 ML: 9 INJECTION, SOLUTION INTRAVENOUS at 05:13

## 2021-10-04 RX ADMIN — IOPAMIDOL 75 ML: 755 INJECTION, SOLUTION INTRAVENOUS at 05:13

## 2021-10-04 ASSESSMENT — PAIN DESCRIPTION - PAIN TYPE: TYPE: ACUTE PAIN

## 2021-10-04 ASSESSMENT — ENCOUNTER SYMPTOMS
VOMITING: 0
COUGH: 0
ABDOMINAL PAIN: 1
SHORTNESS OF BREATH: 0
DIARRHEA: 0
BACK PAIN: 0

## 2021-10-04 ASSESSMENT — PAIN SCALES - GENERAL: PAINLEVEL_OUTOF10: 6

## 2021-10-04 ASSESSMENT — PAIN DESCRIPTION - LOCATION: LOCATION: ABDOMEN

## 2021-10-04 NOTE — ED PROVIDER NOTES
EMERGENCY DEPARTMENT ENCOUNTER    Pt Name: Jared Tavares  MRN: [id number]  [place] 1990  Date of evaluation: 10/4/21  CHIEF COMPLAINT       Chief Complaint   Patient presents with    Abdominal Pain     HISTORY OF PRESENT ILLNESS   HPI     This is a 47-year-old male comes in today with right lower quadrant abdominal pain about 5 days ago the patient states that he was lifting TVs and he felt something pull on his right lower quadrant since then he has been having pain is not taking anything for it denies any nausea or vomiting no difficult time having bowel movements no chest pain no shortness of breath no history of similar symptoms he states that he is having a difficult time urinating and sometimes it burns when he urinates but he denies any concern for sexually transmitted diseases at this time. REVIEW OF SYSTEMS     Review of Systems   Constitutional: Negative for fever. HENT: Negative for congestion. Respiratory: Negative for cough and shortness of breath. Cardiovascular: Negative for chest pain. Gastrointestinal: Positive for abdominal pain. Negative for diarrhea and vomiting. Genitourinary: Negative for dysuria. Musculoskeletal: Negative for back pain. Skin: Negative for rash. Neurological: Negative for headaches. All other systems reviewed and are negative. PASTMEDICAL HISTORY   History reviewed. No pertinent past medical history.   SURGICAL HISTORY       Past Surgical History:   Procedure Laterality Date    KNEE ARTHROSCOPY      TONSILLECTOMY       CURRENT MEDICATIONS       Discharge Medication List as of 10/4/2021  5:54 AM      CONTINUE these medications which have NOT CHANGED    Details   traZODone (DESYREL) 50 MG tablet Take 1 tablet by mouth nightly as needed for Sleep, Disp-30 tablet,R-0Normal      FLUoxetine (PROZAC) 10 MG capsule Take 1 capsule by mouth daily, Disp-30 capsule,R-3Normal      OLANZapine (ZYPREXA) 5 MG tablet Take 1 tablet by mouth nightly, Disp-30 tablet,R-3Normal           ALLERGIES     has No Known Allergies. FAMILY HISTORY     has no family status information on file. SOCIAL HISTORY       Social History     Tobacco Use    Smoking status: Current Every Day Smoker     Packs/day: 1.50     Types: Cigarettes    Smokeless tobacco: Never Used    Tobacco comment: goal to quit by christmas 2020    Vaping Use    Vaping Use: Never used   Substance Use Topics    Alcohol use: Not Currently    Drug use: Not Currently     PHYSICAL EXAM     INITIAL VITALS: /84   Pulse 78   Temp 98.1 °F (36.7 °C) (Oral)   Resp 18   SpO2 100%    Physical Exam  Vitals and nursing note reviewed. Constitutional:       General: He is not in acute distress. Appearance: He is well-developed. HENT:      Head: Normocephalic and atraumatic. Eyes:      Conjunctiva/sclera: Conjunctivae normal.   Cardiovascular:      Rate and Rhythm: Normal rate and regular rhythm. Heart sounds: No murmur heard. No friction rub. Pulmonary:      Effort: Pulmonary effort is normal. No respiratory distress. Breath sounds: Normal breath sounds. No wheezing or rhonchi. Abdominal:      Palpations: Abdomen is soft. Comments: Thin abdomen right lower quadrant abdominal tenderness to palpation without rebound or guarding   Musculoskeletal:      Cervical back: Neck supple. Skin:     General: Skin is warm and dry. Capillary Refill: Capillary refill takes less than 2 seconds. Neurological:      Mental Status: He is alert. DIAGNOSTIC RESULTS   RADIOLOGY:All plain film, CT, MRI, and formal ultrasound images (except ED bedside ultrasound) are read by the radiologist, see reports below, unless otherwisenoted in MDM or here. CT ABDOMEN PELVIS W IV CONTRAST Additional Contrast? None   Final Result   Normal examination. LABS: All lab results were reviewed by myself, and all abnormals are listed below.   Labs Reviewed   CBC WITH AUTO DIFFERENTIAL - Abnormal; Notable for the following components:       Result Value    Monocytes 11 (*)     All other components within normal limits   LIPASE - Abnormal; Notable for the following components:    Lipase 62 (*)     All other components within normal limits   HEPATIC FUNCTION PANEL - Abnormal; Notable for the following components: Total Bilirubin 0.29 (*)     All other components within normal limits   URINALYSIS - Abnormal; Notable for the following components:    Specific Gravity, UA 1.056 (*)     All other components within normal limits   BASIC METABOLIC PANEL   MAGNESIUM       EMERGENCY DEPARTMENTCOURSE:   Differential diagnosis includes appendicitis cholecystitis diverticulitis hernia strain muscle    6:47 AM EDT  There is no elevation in creatinine no electrolyte abnormality no anion gap elevation mild elevation in lipase but not 3 times the upper limit of normal to call pancreatitis and his pain is in the right lower quadrant no leukocytosis no anemia. Urinalysis negative for infection. CT abd negative for acute process I suspect strained muscle after lifting patient be discharged     Vitals:    Vitals:    10/04/21 0526 10/04/21 0530 10/04/21 0545 10/04/21 0600   BP: 132/81 (!) 143/74 125/73 131/84   Pulse:       Resp:       Temp:       TempSrc:       SpO2: 100% 100% 100% 100%       The patient was given the following medications while in the emergency department:  Orders Placed This Encounter   Medications    0.9 % sodium chloride bolus    iopamidol (ISOVUE-370) 76 % injection 75 mL    0.9 % sodium chloride bolus    sodium chloride flush 0.9 % injection 10 mL    ketorolac (TORADOL) injection 30 mg         FINAL IMPRESSION      1.  Abdominal pain, unspecified abdominal location         DISPOSITION/PLAN   DISPOSITION Decision To Discharge 10/04/2021 05:54:02 AM      PATIENT REFERRED TO:  KELLY TANNER [facility] [address]  [address] [address]  In 3 days    Long Easton MD  Attending Emergency Physician    This charting supersedes any ED resident or staff charting and was written using speech recognition software        Nico Cuevas MD  10/04/21 8353

## 2021-11-10 ENCOUNTER — HOSPITAL ENCOUNTER (OUTPATIENT)
Age: 31
Setting detail: SPECIMEN
Discharge: HOME | End: 2021-11-10
Payer: COMMERCIAL

## 2021-11-11 LAB
ABSOLUTE EOS #: 0.13 K/UL (ref 0–0.44)
ABSOLUTE IMMATURE GRANULOCYTE: <0.03 K/UL (ref 0–0.3)
ABSOLUTE LYMPH #: 2.39 K/UL (ref 1.1–3.7)
ABSOLUTE MONO #: 0.75 K/UL (ref 0.1–1.2)
ALBUMIN SERPL-MCNC: 4.6 G/DL (ref 3.5–5.2)
ALBUMIN/GLOBULIN RATIO: 1.8 (ref 1–2.5)
ALP BLD-CCNC: 75 U/L (ref 40–129)
ALT SERPL-CCNC: 9 U/L (ref 5–41)
ANION GAP SERPL CALCULATED.3IONS-SCNC: 13 MMOL/L (ref 9–17)
AST SERPL-CCNC: 18 U/L
BASOPHILS # BLD: 0 % (ref 0–2)
BASOPHILS ABSOLUTE: <0.03 K/UL (ref 0–0.2)
BILIRUB SERPL-MCNC: 0.44 MG/DL (ref 0.3–1.2)
BUN BLDV-MCNC: 9 MG/DL (ref 6–20)
BUN/CREAT BLD: NORMAL (ref 9–20)
CALCIUM SERPL-MCNC: 9.4 MG/DL (ref 8.6–10.4)
CHLORIDE BLD-SCNC: 104 MMOL/L (ref 98–107)
CHOLESTEROL/HDL RATIO: 3
CHOLESTEROL: 144 MG/DL
CO2: 24 MMOL/L (ref 20–31)
CREAT SERPL-MCNC: 0.99 MG/DL (ref 0.7–1.2)
DIFFERENTIAL TYPE: NORMAL
EOSINOPHILS RELATIVE PERCENT: 2 % (ref 1–4)
GFR AFRICAN AMERICAN: >60 ML/MIN
GFR NON-AFRICAN AMERICAN: >60 ML/MIN
GFR SERPL CREATININE-BSD FRML MDRD: NORMAL ML/MIN/{1.73_M2}
GFR SERPL CREATININE-BSD FRML MDRD: NORMAL ML/MIN/{1.73_M2}
GLUCOSE BLD-MCNC: 83 MG/DL (ref 70–99)
HCT VFR BLD CALC: 43.4 % (ref 40.7–50.3)
HDLC SERPL-MCNC: 48 MG/DL
HEMOGLOBIN: 13.3 G/DL (ref 13–17)
IMMATURE GRANULOCYTES: 0 %
LDL CHOLESTEROL: 76 MG/DL (ref 0–130)
LYMPHOCYTES # BLD: 35 % (ref 24–43)
MCH RBC QN AUTO: 28.9 PG (ref 25.2–33.5)
MCHC RBC AUTO-ENTMCNC: 30.6 G/DL (ref 28.4–34.8)
MCV RBC AUTO: 94.1 FL (ref 82.6–102.9)
MONOCYTES # BLD: 11 % (ref 3–12)
NRBC AUTOMATED: 0 PER 100 WBC
PDW BLD-RTO: 13.6 % (ref 11.8–14.4)
PLATELET # BLD: 273 K/UL (ref 138–453)
PLATELET ESTIMATE: NORMAL
PMV BLD AUTO: 11.3 FL (ref 8.1–13.5)
POTASSIUM SERPL-SCNC: 5.2 MMOL/L (ref 3.7–5.3)
RBC # BLD: 4.61 M/UL (ref 4.21–5.77)
RBC # BLD: NORMAL 10*6/UL
SEG NEUTROPHILS: 52 % (ref 36–65)
SEGMENTED NEUTROPHILS ABSOLUTE COUNT: 3.6 K/UL (ref 1.5–8.1)
SODIUM BLD-SCNC: 141 MMOL/L (ref 135–144)
THYROXINE, FREE: 1.01 NG/DL (ref 0.93–1.7)
TOTAL PROTEIN: 7.2 G/DL (ref 6.4–8.3)
TRIGL SERPL-MCNC: 102 MG/DL
TSH SERPL DL<=0.05 MIU/L-ACNC: 3.95 MIU/L (ref 0.3–5)
VITAMIN D 25-HYDROXY: 24.6 NG/ML (ref 30–100)
VLDLC SERPL CALC-MCNC: NORMAL MG/DL (ref 1–30)
WBC # BLD: 6.9 K/UL (ref 3.5–11.3)
WBC # BLD: NORMAL 10*3/UL

## 2021-11-23 ENCOUNTER — HOSPITAL ENCOUNTER (EMERGENCY)
Age: 31
Discharge: HOME | End: 2021-11-24
Attending: EMERGENCY MEDICINE
Payer: COMMERCIAL

## 2021-11-23 VITALS
TEMPERATURE: 98.1 F | WEIGHT: 155 LBS | BODY MASS INDEX: 22.19 KG/M2 | OXYGEN SATURATION: 98 % | HEIGHT: 70 IN | SYSTOLIC BLOOD PRESSURE: 119 MMHG | HEART RATE: 73 BPM | RESPIRATION RATE: 16 BRPM | DIASTOLIC BLOOD PRESSURE: 81 MMHG

## 2021-11-23 DIAGNOSIS — S61.211A LACERATION OF LEFT INDEX FINGER WITHOUT FOREIGN BODY WITHOUT DAMAGE TO NAIL, INITIAL ENCOUNTER: Primary | ICD-10-CM

## 2021-11-23 PROCEDURE — 2500000003 HC RX 250 WO HCPCS: Performed by: EMERGENCY MEDICINE

## 2021-11-23 PROCEDURE — 90471 IMMUNIZATION ADMIN: CPT | Performed by: EMERGENCY MEDICINE

## 2021-11-23 PROCEDURE — 12002 RPR S/N/AX/GEN/TRNK2.6-7.5CM: CPT

## 2021-11-23 PROCEDURE — 6360000002 HC RX W HCPCS: Performed by: EMERGENCY MEDICINE

## 2021-11-23 PROCEDURE — 99284 EMERGENCY DEPT VISIT MOD MDM: CPT

## 2021-11-23 PROCEDURE — 90715 TDAP VACCINE 7 YRS/> IM: CPT | Performed by: EMERGENCY MEDICINE

## 2021-11-23 RX ORDER — LIDOCAINE HYDROCHLORIDE 20 MG/ML
5 INJECTION, SOLUTION INFILTRATION; PERINEURAL ONCE
Status: COMPLETED | OUTPATIENT
Start: 2021-11-24 | End: 2021-11-23

## 2021-11-23 RX ADMIN — TETANUS TOXOID, REDUCED DIPHTHERIA TOXOID AND ACELLULAR PERTUSSIS VACCINE, ADSORBED 0.5 ML: 5; 2.5; 8; 8; 2.5 SUSPENSION INTRAMUSCULAR at 23:53

## 2021-11-23 RX ADMIN — LIDOCAINE HYDROCHLORIDE 5 ML: 20 INJECTION, SOLUTION INFILTRATION; PERINEURAL at 23:54

## 2021-11-23 ASSESSMENT — PAIN SCALES - GENERAL
PAINLEVEL_OUTOF10: 6
PAINLEVEL_OUTOF10: 10

## 2021-11-23 NOTE — LETTER
[address] ED  [address] [facility] [address]  Phone: [phone]               November 24, 2021    Patient: Rhonda Brooks   YOB: 1990   Date of Visit: 11/23/2021       To Whom It May Concern:    Micki Ramirez was seen and treated in our emergency department on 11/23/2021. He may return to work on 11/26/2021.       Sincerely,           Signature:__________________________________

## 2021-11-24 NOTE — ED TRIAGE NOTES
Mode of arrival (squad #, walk in, police, etc) : walk in        Chief complaint(s): finger laceration        Arrival Note (brief scenario, treatment PTA, etc). : pt. States he cut his finger using a utility knife earlier this evening. Bleeding controlled. Laceration appears to be about 2 in long around the left pointer finger. C= \"Have you ever felt that you should Cut down on your drinking? \"  No  A= \"Have people Annoyed you by criticizing your drinking? \"  No  G= \"Have you ever felt bad or Guilty about your drinking? \"  No  E= \"Have you ever had a drink as an Eye-opener first thing in the morning to steady your nerves or to help a hangover? \"  No      Deferred []      Reason for deferring: N/A    *If yes to two or more: probable alcohol abuse. *

## 2021-11-24 NOTE — ED PROVIDER NOTES
[address] ED  EMERGENCY DEPARTMENT ENCOUNTER      Pt Name: Jay Wyman  MRN: [id number]  [place] 1990  Date of evaluation: 11/24/21      CHIEF COMPLAINT       Chief Complaint   Patient presents with    Laceration     finger     HISTORY OF PRESENT ILLNESS   HPI 33 y.o. male presents with c/o with a left index finger laceration. Injury happened just prior to presentation. Patient reports that he was working on his house using  and it slipped and he cut his left index finger. Bleeding controlled with direct pressure. Unknown last tetanus. Reports mild pain in his finger. REVIEW OF SYSTEMS       Review of Systems   Skin: Positive for wound. Neurological: Negative for weakness and numbness. Hematological: Does not bruise/bleed easily. PAST MEDICAL HISTORY   History reviewed. No pertinent past medical history. SURGICAL HISTORY       Past Surgical History:   Procedure Laterality Date    KNEE ARTHROSCOPY      TONSILLECTOMY         CURRENT MEDICATIONS       Discharge Medication List as of 11/24/2021  1:11 AM      CONTINUE these medications which have NOT CHANGED    Details   traZODone (DESYREL) 50 MG tablet Take 1 tablet by mouth nightly as needed for Sleep, Disp-30 tablet,R-0Normal      FLUoxetine (PROZAC) 10 MG capsule Take 1 capsule by mouth daily, Disp-30 capsule,R-3Normal      OLANZapine (ZYPREXA) 5 MG tablet Take 1 tablet by mouth nightly, Disp-30 tablet,R-3Normal             ALLERGIES     has No Known Allergies. FAMILY HISTORY     has no family status information on file. SOCIAL HISTORY      reports that he has been smoking cigarettes. He has a 30.00 pack-year smoking history. He has never used smokeless tobacco. He reports current alcohol use. He reports previous drug use.     PHYSICAL EXAM     INITIAL VITALS: /81   Pulse 73   Temp 98.1 °F (36.7 °C) (Oral)   Resp 16   Ht 5' 10\" (1.778 m)   Wt 155 lb (70.3 kg)   SpO2 98%   BMI 22.24 kg/m² General: NAD  Head: NCAT  Cardio vascular: RRR  Pulmonary: CTA  MSK: Full range of motion at the MCP DIP and PIP joints  Skin: There is a 3 cm laceration to the radial aspect of the left index finger. Neuro: Appropriate sensation in the median radial ulnar dermatomes    MEDICAL DECISION MAKING:     MDM  33 y.o. male resenting with finger laceration. Procedure repaired per the procedure note. Patient is neurovascularly intact. Tetanus updated. D/w pt treatment plan, warning precautions for prompt ED return and importance of close OP FU, he verbalizes understanding and agrees with the treatment plan. 4 sutures L intex finger      DIAGNOSTIC RESULTS     EMERGENCY DEPARTMENT COURSE:   Vitals:    Vitals:    11/23/21 2305 11/23/21 2307   BP:  119/81   Pulse: 73    Resp: 16    Temp: 98.1 °F (36.7 °C)    TempSrc: Oral    SpO2: 98%    Weight: 155 lb (70.3 kg)    Height: 5' 10\" (1.778 m)        The patient was given the following medications while in the emergency department:  Orders Placed This Encounter   Medications    Tetanus-Diphth-Acell Pertussis (BOOSTRIX) injection 0.5 mL    lidocaine 2 % injection 5 mL     -------------------------  CRITICAL CARE:   CONSULTS: None  PROCEDURES: Lac Repair    Date/Time: 11/24/2021 8:44 PM  Performed by: Grant Paredes MD  Authorized by: Grant Paredes MD     Consent:     Consent obtained:  Verbal    Consent given by:  Patient    Risks discussed:  Infection and pain    Alternatives discussed:  No treatment  Anesthesia (see MAR for exact dosages):      Anesthesia method:  Local infiltration    Local anesthetic:  Lidocaine 2% w/o epi  Laceration details:     Location:  Finger    Finger location:  L index finger    Length (cm):  3    Depth (mm):  5  Repair type:     Repair type:  Simple  Pre-procedure details:     Preparation:  Patient was prepped and draped in usual sterile fashion and imaging obtained to evaluate for foreign bodies  Exploration:     Hemostasis achieved with:  Direct pressure    Wound exploration: wound explored through full range of motion      Contaminated: no    Treatment:     Area cleansed with:  Betadine and saline    Amount of cleaning:  Standard    Irrigation solution:  Sterile saline    Irrigation volume:  1000    Irrigation method:  Pressure wash    Visualized foreign bodies/material removed: no    Skin repair:     Repair method:  Sutures    Suture size:  4-0    Suture material:  Nylon    Suture technique:  Simple interrupted    Number of sutures:  4  Approximation:     Approximation:  Close  Post-procedure details:     Dressing:  Antibiotic ointment    Patient tolerance of procedure: Tolerated well, no immediate complications         FINAL IMPRESSION      1.  Laceration of left index finger without foreign body without damage to nail, initial encounter          DISPOSITION/PLAN   DISPOSITION Decision To Discharge 11/24/2021 01:10:06 AM      PATIENT REFERRED TO:  Your Primary Care Provider    In 1 week      [facility] ED  [address] [address]  [phone]    If symptoms worsen      DISCHARGE MEDICATIONS:  Discharge Medication List as of 11/24/2021  1:11 AM            Young Murphy MD  Attending Emergency Physician                     Young Murphy MD  11/24/21 2043

## 2021-12-24 ENCOUNTER — HOSPITAL ENCOUNTER (EMERGENCY)
Age: 31
Discharge: HOME | End: 2021-12-25
Attending: EMERGENCY MEDICINE
Payer: COMMERCIAL

## 2021-12-24 ENCOUNTER — APPOINTMENT (OUTPATIENT)
Dept: GENERAL RADIOLOGY | Age: 31
End: 2021-12-24
Payer: COMMERCIAL

## 2021-12-24 VITALS
HEART RATE: 92 BPM | RESPIRATION RATE: 16 BRPM | HEIGHT: 70 IN | SYSTOLIC BLOOD PRESSURE: 137 MMHG | BODY MASS INDEX: 21.33 KG/M2 | WEIGHT: 149 LBS | OXYGEN SATURATION: 98 % | TEMPERATURE: 98.9 F | DIASTOLIC BLOOD PRESSURE: 86 MMHG

## 2021-12-24 DIAGNOSIS — S62.339A CLOSED BOXER'S FRACTURE, INITIAL ENCOUNTER: Primary | ICD-10-CM

## 2021-12-24 PROCEDURE — 96372 THER/PROPH/DIAG INJ SC/IM: CPT

## 2021-12-24 PROCEDURE — 6360000002 HC RX W HCPCS: Performed by: EMERGENCY MEDICINE

## 2021-12-24 PROCEDURE — 73130 X-RAY EXAM OF HAND: CPT

## 2021-12-24 PROCEDURE — 99283 EMERGENCY DEPT VISIT LOW MDM: CPT

## 2021-12-24 RX ORDER — KETOROLAC TROMETHAMINE 30 MG/ML
30 INJECTION, SOLUTION INTRAMUSCULAR; INTRAVENOUS ONCE
Status: COMPLETED | OUTPATIENT
Start: 2021-12-24 | End: 2021-12-24

## 2021-12-24 RX ORDER — IBUPROFEN 600 MG/1
600 TABLET ORAL 3 TIMES DAILY PRN
Qty: 15 TABLET | Refills: 0 | Status: SHIPPED | OUTPATIENT
Start: 2021-12-24 | End: 2022-07-26

## 2021-12-24 RX ADMIN — KETOROLAC TROMETHAMINE 30 MG: 30 INJECTION, SOLUTION INTRAMUSCULAR; INTRAVENOUS at 22:47

## 2021-12-24 ASSESSMENT — PAIN DESCRIPTION - PAIN TYPE: TYPE: ACUTE PAIN

## 2021-12-24 ASSESSMENT — PAIN DESCRIPTION - FREQUENCY
FREQUENCY: INTERMITTENT
FREQUENCY: CONTINUOUS

## 2021-12-24 ASSESSMENT — PAIN DESCRIPTION - ORIENTATION
ORIENTATION: LEFT
ORIENTATION: LEFT

## 2021-12-24 ASSESSMENT — PAIN DESCRIPTION - DESCRIPTORS
DESCRIPTORS: SHARP
DESCRIPTORS: THROBBING;BURNING

## 2021-12-24 ASSESSMENT — PAIN DESCRIPTION - LOCATION
LOCATION: HAND
LOCATION: HAND

## 2021-12-24 ASSESSMENT — PAIN SCALES - GENERAL
PAINLEVEL_OUTOF10: 6
PAINLEVEL_OUTOF10: 10
PAINLEVEL_OUTOF10: 4

## 2021-12-24 ASSESSMENT — PAIN - FUNCTIONAL ASSESSMENT: PAIN_FUNCTIONAL_ASSESSMENT: PREVENTS OR INTERFERES SOME ACTIVE ACTIVITIES AND ADLS

## 2021-12-24 ASSESSMENT — PAIN DESCRIPTION - PROGRESSION: CLINICAL_PROGRESSION: GRADUALLY IMPROVING

## 2021-12-25 NOTE — ED PROVIDER NOTES
[facility]NCOUNTER    Pt Name: Thee Whitley  MRN: [id number]  [place] 1990  Date of evaluation: 12/24/21  CHIEF COMPLAINT       Chief Complaint   Patient presents with    Hand Injury     left     HISTORY OF PRESENT ILLNESS     Arm Injury  Location:  Hand  Hand location:  L hand  Injury: yes    Mechanism of injury comment:  Punched wall  Pain details:     Quality:  Aching    Severity:  Moderate    Onset quality:  Sudden    Timing:  Constant  Handedness:  Right-handed  Dislocation: no    Foreign body present:  No foreign bodies  Tetanus status:  Up to date  Prior injury to area:  No  Relieved by:  None tried  Worsened by: Movement  Ineffective treatments:  None tried  Risk factors: concern for non-accidental trauma    Patient notes he heard about a friend dying and punched a wall out of frustration. Patient notes moderate pain along the fourth and fifth digits proximally. Patient denies any other modifying, alleviating or precipitating factors. REVIEW OF SYSTEMS     Review of Systems   All other systems reviewed and are negative. PASTMEDICAL HISTORY   History reviewed. No pertinent past medical history. Past Problem List  Patient Active Problem List   Diagnosis Code    Depression with suicidal ideation F32. A, R45.851    Severe episode of recurrent major depressive disorder, without psychotic features ([facility] F33.2    Bipolar depression ([facility] [address] F31.9     SURGICAL HISTORY       Past Surgical History:   Procedure Laterality Date    KNEE ARTHROSCOPY      TONSILLECTOMY       CURRENT MEDICATIONS       Previous Medications    FLUOXETINE (PROZAC) 10 MG CAPSULE    Take 1 capsule by mouth daily    OLANZAPINE (ZYPREXA) 5 MG TABLET    Take 1 tablet by mouth nightly    TRAZODONE (DESYREL) 50 MG TABLET    Take 1 tablet by mouth nightly as needed for Sleep     ALLERGIES     has No Known Allergies. FAMILY HISTORY     has no family status information on file.       SOCIAL HISTORY       Social History     Tobacco Use    Smoking status: Current Every Day Smoker     Packs/day: 1.50     Years: 20.00     Pack years: 30.00     Types: Cigarettes    Smokeless tobacco: Never Used    Tobacco comment: goal to quit in 2022    Vaping Use    Vaping Use: Never used   Substance Use Topics    Alcohol use: Yes     Comment: rarely    Drug use: Not Currently     PHYSICAL EXAM     INITIAL VITALS: /86   Pulse 92   Temp 98.9 °F (37.2 °C) (Oral)   Resp 16   Ht 5' 10\" (1.778 m)   Wt 149 lb (67.6 kg)   SpO2 98%   BMI 21.38 kg/m²    Physical Exam  Constitutional:       General: He is not in acute distress. Appearance: Normal appearance. He is well-developed. He is not diaphoretic. HENT:      Head: Normocephalic and atraumatic. Right Ear: External ear normal.      Left Ear: External ear normal.      Nose: Nose normal. No congestion. Mouth/Throat:      Mouth: Mucous membranes are moist.      Pharynx: Oropharynx is clear. Eyes:      General:         Right eye: No discharge. Left eye: No discharge. Conjunctiva/sclera: Conjunctivae normal.      Pupils: Pupils are equal, round, and reactive to light. Neck:      Trachea: No tracheal deviation. Cardiovascular:      Rate and Rhythm: Normal rate and regular rhythm. Pulses: Normal pulses. Heart sounds: Normal heart sounds. Pulmonary:      Effort: Pulmonary effort is normal. No respiratory distress. Breath sounds: Normal breath sounds. No stridor. No wheezing or rales. Abdominal:      Palpations: Abdomen is soft. Tenderness: There is no abdominal tenderness. There is no guarding or rebound. Musculoskeletal:         General: Swelling and signs of injury present. No tenderness or deformity. Normal range of motion. Cervical back: Normal range of motion and neck supple. Skin:     General: Skin is warm and dry. Capillary Refill: Capillary refill takes less than 2 seconds. Findings: No erythema or rash.

## 2021-12-25 NOTE — ED NOTES
Ice Pack applied left hand/wrist. Portable Hand Xray done. Medicated per order.      Jessica Soto RN  12/24/21 [id number]

## 2022-01-18 ENCOUNTER — OFFICE VISIT (OUTPATIENT)
Dept: ORTHOPEDIC SURGERY | Age: 32
End: 2022-01-18
Payer: COMMERCIAL

## 2022-01-18 VITALS — BODY MASS INDEX: 21.33 KG/M2 | WEIGHT: 149 LBS | HEIGHT: 70 IN | RESPIRATION RATE: 14 BRPM

## 2022-01-18 DIAGNOSIS — S62.317A CLOSED DISPLACED FRACTURE OF BASE OF FIFTH METACARPAL BONE OF LEFT HAND, INITIAL ENCOUNTER: Primary | ICD-10-CM

## 2022-01-18 PROCEDURE — 99203 OFFICE O/P NEW LOW 30 MIN: CPT | Performed by: ORTHOPAEDIC SURGERY

## 2022-01-18 PROCEDURE — G8427 DOCREV CUR MEDS BY ELIG CLIN: HCPCS | Performed by: ORTHOPAEDIC SURGERY

## 2022-01-18 PROCEDURE — 4004F PT TOBACCO SCREEN RCVD TLK: CPT | Performed by: ORTHOPAEDIC SURGERY

## 2022-01-18 PROCEDURE — G8420 CALC BMI NORM PARAMETERS: HCPCS | Performed by: ORTHOPAEDIC SURGERY

## 2022-01-18 PROCEDURE — G8484 FLU IMMUNIZE NO ADMIN: HCPCS | Performed by: ORTHOPAEDIC SURGERY

## 2022-01-18 NOTE — PROGRESS NOTES
Patient ID: Sebastián Gilbert is a 33 y.o. male    Chief Compliant:  Chief Complaint   Patient presents with    Hand Pain     left        Diagnostic imaging:    AP lateral oblique left hand comminuted small finger metacarpal base fracture no change in position with respect to 4 weeks ago    Assessment and Plan:  1. Closed displaced fracture of base of fifth metacarpal bone of left hand, initial encounter      Continue use of splint    Follow up 2 weeks with new x-rays    HPI:  This is a 33 y.o. male who presents to the clinic today as a new patient for left hand evaluation. Patient was seen at the [facility] ED on 12/24 with acute onset of left hand pain after punching a wall. He was provided a thumb spica splint by the ER. He notes ongoing pain over the base of his left 5th MC    Review of Systems   All other systems reviewed and are negative.       Past History:    Current Outpatient Medications:     ibuprofen (ADVIL;MOTRIN) 600 MG tablet, Take 1 tablet by mouth 3 times daily as needed for Pain, Disp: 15 tablet, Rfl: 0    traZODone (DESYREL) 50 MG tablet, Take 1 tablet by mouth nightly as needed for Sleep, Disp: 30 tablet, Rfl: 0    FLUoxetine (PROZAC) 10 MG capsule, Take 1 capsule by mouth daily, Disp: 30 capsule, Rfl: 3    OLANZapine (ZYPREXA) 5 MG tablet, Take 1 tablet by mouth nightly, Disp: 30 tablet, Rfl: 3  No Known Allergies  Social History     Socioeconomic History    Marital status: Single     Spouse name: Not on file    Number of children: Not on file    Years of education: Not on file    Highest education level: Not on file   Occupational History    Not on file   Tobacco Use    Smoking status: Current Every Day Smoker     Packs/day: 1.50     Years: 20.00     Pack years: 30.00     Types: Cigarettes    Smokeless tobacco: Never Used    Tobacco comment: goal to quit in 2022    Vaping Use    Vaping Use: Never used   Substance and Sexual Activity    Alcohol use: Yes     Comment: rarely  Drug use: Not Currently    Sexual activity: Not on file   Other Topics Concern    Not on file   Social History Narrative    Not on file     Social Determinants of Health     Financial Resource Strain:     Difficulty of Paying Living Expenses: Not on file   Food Insecurity:     Worried About Running Out of Food in the Last Year: Not on file    Philip of Food in the Last Year: Not on file   Transportation Needs:     Lack of Transportation (Medical): Not on file    Lack of Transportation (Non-Medical): Not on file   Physical Activity:     Days of Exercise per Week: Not on file    Minutes of Exercise per Session: Not on file   Stress:     Feeling of Stress : Not on file   Social Connections:     Frequency of Communication with Friends and Family: Not on file    Frequency of Social Gatherings with Friends and Family: Not on file    Attends [employer]ism Services: Not on file    Active Member of [address] [place] [address] [employer] or Organizations: Not on file    Attends Club or Organization Meetings: Not on file    Marital Status: Not on file   Intimate Partner Violence:     Fear of Current or Ex-Partner: Not on file    Emotionally Abused: Not on file    Physically Abused: Not on file    Sexually Abused: Not on file   Housing Stability:     Unable to Pay for Housing in the Last Year: Not on file    Number of [place] in the Last Year: Not on file    Unstable Housing in the Last Year: Not on file     No past medical history on file. Past Surgical History:   Procedure Laterality Date    KNEE ARTHROSCOPY      TONSILLECTOMY       No family history on file. Physical Exam:  Vitals signs and nursing note reviewed. Constitutional:       Appearance: well-developed. HENT:      Head: Normocephalic and atraumatic. Nose: Nose normal.   Eyes:      Conjunctiva/sclera: Conjunctivae normal.   Neck:      Musculoskeletal: Normal range of motion and neck supple.    Pulmonary:      Effort: Pulmonary effort is normal. No respiratory distress. Musculoskeletal:      Comments: Normal gait     Skin:     General: Skin is warm and dry. Neurological:      Mental Status: Alert and oriented to person, place, and time. Sensory: No sensory deficit. Psychiatric:         Behavior: Behavior normal.         Thought Content: Thought content normal.    Tenderness over fracture site    Provider Attestation:  Briseyda Guerrero, personally performed the services described in this documentation. All medical record entries made by the scribe were at my direction and in my presence. I have reviewed the chart and discharge instructions and agree that the records reflect my personal performance and is accurate and complete. Jillian Awad MD 1/18/22     Scribe Attestation:  By signing my name below, Vidhyasita Salgado, attest that this documentation has been prepared under the direction and in the presence of Dr. Alana Melton. Electronically signed: Berhane Reynolds, 1/18/22     Please note that this chart was generated using voice recognition Dragon dictation software. Although every effort was made to ensure the accuracy of this automated transcription, some errors in transcription may have occurred.

## 2022-07-26 ENCOUNTER — HOSPITAL ENCOUNTER (EMERGENCY)
Age: 32
Discharge: HOME | End: 2022-07-26
Attending: EMERGENCY MEDICINE
Payer: COMMERCIAL

## 2022-07-26 VITALS
SYSTOLIC BLOOD PRESSURE: 117 MMHG | TEMPERATURE: 98.7 F | HEART RATE: 99 BPM | HEIGHT: 70 IN | WEIGHT: 142 LBS | RESPIRATION RATE: 18 BRPM | BODY MASS INDEX: 20.33 KG/M2 | DIASTOLIC BLOOD PRESSURE: 86 MMHG | OXYGEN SATURATION: 100 %

## 2022-07-26 DIAGNOSIS — L02.91 ABSCESS: Primary | ICD-10-CM

## 2022-07-26 PROCEDURE — 10060 I&D ABSCESS SIMPLE/SINGLE: CPT

## 2022-07-26 PROCEDURE — 99283 EMERGENCY DEPT VISIT LOW MDM: CPT

## 2022-07-26 PROCEDURE — 6370000000 HC RX 637 (ALT 250 FOR IP): Performed by: EMERGENCY MEDICINE

## 2022-07-26 RX ORDER — DOXYCYCLINE 100 MG/1
100 CAPSULE ORAL ONCE
Status: COMPLETED | OUTPATIENT
Start: 2022-07-26 | End: 2022-07-26

## 2022-07-26 RX ORDER — DOXYCYCLINE HYCLATE 100 MG/1
100 CAPSULE ORAL 2 TIMES DAILY
Qty: 14 CAPSULE | Refills: 0 | Status: SHIPPED | OUTPATIENT
Start: 2022-07-26 | End: 2022-08-02

## 2022-07-26 RX ADMIN — DOXYCYCLINE 100 MG: 100 CAPSULE ORAL at 22:08

## 2022-07-26 ASSESSMENT — ENCOUNTER SYMPTOMS
CONSTIPATION: 0
NAUSEA: 0
COUGH: 0
VOMITING: 0
ABDOMINAL PAIN: 0
SHORTNESS OF BREATH: 0
DIARRHEA: 0

## 2022-07-27 NOTE — ED NOTES
Mode of arrival (squad #, walk in, police, etc) : walk in         Chief complaint(s): abscess        Arrival Note (brief scenario, treatment PTA, etc). : pt reports a \"bump\" on right inguinal area that has progressively gotten larger over the last 2 days and became more painful and red. Pt denies fevers or chills. C= \"Have you ever felt that you should Cut down on your drinking? \"  No  A= \"Have people Annoyed you by criticizing your drinking? \"  No  G= \"Have you ever felt bad or Guilty about your drinking? \"  No  E= \"Have you ever had a drink as an Eye-opener first thing in the morning to steady your nerves or to help a hangover? \"  No      Deferred []      Reason for deferring: N/A    *If yes to two or more: probable alcohol abuse. Camila Mccabe RN  07/26/22 7170

## 2022-07-27 NOTE — ED PROVIDER NOTES
[address] ED  eMERGENCY dEPARTMENT eNCOUnter      Pt Name: Alok Rene  MRN: [id number]  [place] 1990  Date of evaluation: 7/26/22      CHIEF COMPLAINT       Chief Complaint   Patient presents with    Abscess         HISTORY OF PRESENT ILLNESS    Alok Rene is a 27 y.o. male who presents complaining of abscess. Swelling and redness to the mons pubis area for the last 2 days. Patient is having moderate to severe pain with it. Patient states it started with an ingrown hair that he pulled and then got a snyder and then states that he tried to pop that. REVIEW OF SYSTEMS       Review of Systems   Constitutional:  Negative for chills and fever. Respiratory:  Negative for cough and shortness of breath. Cardiovascular:  Negative for chest pain and palpitations. Gastrointestinal:  Negative for abdominal pain, constipation, diarrhea, nausea and vomiting. Skin:  Positive for wound. Neurological:  Negative for dizziness, seizures and headaches. PAST MEDICAL HISTORY     Past Medical History:   Diagnosis Date    Bipolar 1 disorder ([address]     Depression     PTSD (post-traumatic stress disorder)        SURGICAL HISTORY       Past Surgical History:   Procedure Laterality Date    KNEE ARTHROSCOPY      TONSILLECTOMY         CURRENT MEDICATIONS       Previous Medications    FLUOXETINE (PROZAC) 10 MG CAPSULE    Take 1 capsule by mouth daily    OLANZAPINE (ZYPREXA) 5 MG TABLET    Take 1 tablet by mouth nightly    TRAZODONE (DESYREL) 50 MG TABLET    Take 1 tablet by mouth nightly as needed for Sleep       ALLERGIES     is allergic to ibuprofen. SOCIAL HISTORY      reports that he has been smoking cigarettes. He has a 30.00 pack-year smoking history. He has never used smokeless tobacco. He reports that he does not currently use alcohol. He reports that he does not currently use drugs.     PHYSICAL EXAM     INITIAL VITALS: /86   Pulse 99   Temp 98.7 °F (37.1 °C) (Oral)   Resp 18 Ht 5' 10\" (1.778 m)   Wt 142 lb (64.4 kg)   SpO2 100%   BMI 20.37 kg/m²      Physical Exam  Vitals and nursing note reviewed. Constitutional:       General: He is not in acute distress. Appearance: He is well-developed. He is not diaphoretic. HENT:      Head: Normocephalic and atraumatic. Eyes:      General: No scleral icterus. Right eye: No discharge. Left eye: No discharge. Conjunctiva/sclera: Conjunctivae normal.      Pupils: Pupils are equal, round, and reactive to light. Pulmonary:      Effort: Pulmonary effort is normal. No respiratory distress. Skin:     General: Skin is warm and dry. Coloration: Skin is not pale. Findings: Lesion (4 cm area of erythema in the mons pubis with an area of induration) present. No erythema or rash. Neurological:      Mental Status: He is alert and oriented to person, place, and time. Psychiatric:         Behavior: Behavior normal.         Thought Content: Thought content normal.         Judgment: Judgment normal.       DIAGNOSTIC RESULTS     RADIOLOGY:All plain film, CT,MRI, and formal ultrasound images (except ED bedside ultrasound) are read by the radiologist and the interpretations are directly viewed by the emergency physician. LABS: All lab results were reviewed by myself, and all abnormals are listed below. Labs Reviewed - No data to display      MEDICAL DECISION MAKING:     We will do an ultrasound to see if there looks like there might be a pocket to I&D.       EMERGENCY DEPARTMENT COURSE:   Vitals:    Vitals:    07/26/22 2141   BP: 117/86   Pulse: 99   Resp: 18   Temp: 98.7 °F (37.1 °C)   TempSrc: Oral   SpO2: 100%   Weight: 142 lb (64.4 kg)   Height: 5' 10\" (1.778 m)       The patient was given the following medications while in the emergency department:  Orders Placed This Encounter   Medications    doxycycline hyclate (VIBRAMYCIN) 100 MG capsule     Sig: Take 1 capsule by mouth in the morning and 1 capsule before bedtime. Do all this for 7 days. Dispense:  14 capsule     Refill:  0     May substitute another form of doxycycline if insurance requires. doxycycline monohydrate (MONODOX) capsule 100 mg     Order Specific Question:   Antimicrobial Indications     Answer:   Skin and Soft Tissue Infection       -------------------------  10:08 PM EDT  Bedside ultrasound showed some cobblestoning areas with fluid so I decided to go ahead and do an I&D which did not yield really any purulent discharge. CONSULTS:  None    PROCEDURES:  Incision and Drainage Procedure Note    Indication: Abscess    Procedure: The patient was positioned appropriately and the skin over the incision site was draped in a sterile fashion. Local anesthesia was obtained by infiltration using 1% Lidocaine without epinephrine. An incision was then made over the greatest area of fluctuance and no material was expressed. Loculations were not present. The drainage cavity was then dressed with a sterile dressing. The patients tetanus status was up to date and did not require a booster dose. The patient tolerated the procedure well. Complications: None        FINAL IMPRESSION      1. Abscess          DISPOSITION/PLAN   DISPOSITION Decision To Discharge 07/26/2022 10:06:35 PM      PATIENT REFERREDTO:  Deana Mayo  [place] [id number] [address]  [phone]    Schedule an appointment as soon as possible for a visit in 3 days  Follow up within 3 days, Return to ED sooner if symptoms worsen    [facility] ED  [facility] [id number] [phone]    If symptoms worsen      DISCHARGEMEDICATIONS:  New Prescriptions    DOXYCYCLINE HYCLATE (VIBRAMYCIN) 100 MG CAPSULE    Take 1 capsule by mouth in the morning and 1 capsule before bedtime. Do all this for 7 days.        (Please note that portions of this note were completed with a voice recognition program.  Efforts were made to edit thedictations but occasionally words are mis-transcribed.)    Min Quinn MD  Attending Emergency Physician                        Min Quinn MD  07/26/22 5252

## 2022-09-26 ENCOUNTER — HOSPITAL ENCOUNTER (OUTPATIENT)
Age: 32
Discharge: HOME | End: 2022-09-28
Payer: COMMERCIAL

## 2022-09-26 ENCOUNTER — HOSPITAL ENCOUNTER (OUTPATIENT)
Dept: GENERAL RADIOLOGY | Age: 32
Discharge: HOME | End: 2022-09-28
Payer: COMMERCIAL

## 2022-09-26 DIAGNOSIS — M50.31 OTHER CERVICAL DISC DEGENERATION, HIGH CERVICAL REGION: ICD-10-CM

## 2022-09-26 PROCEDURE — 72040 X-RAY EXAM NECK SPINE 2-3 VW: CPT

## 2023-01-03 ENCOUNTER — HOSPITAL ENCOUNTER (EMERGENCY)
Age: 33
Discharge: HOME | End: 2023-01-03
Attending: EMERGENCY MEDICINE
Payer: COMMERCIAL

## 2023-01-03 ENCOUNTER — APPOINTMENT (OUTPATIENT)
Dept: GENERAL RADIOLOGY | Age: 33
End: 2023-01-03
Payer: COMMERCIAL

## 2023-01-03 VITALS
BODY MASS INDEX: 22.19 KG/M2 | WEIGHT: 155 LBS | HEIGHT: 70 IN | DIASTOLIC BLOOD PRESSURE: 63 MMHG | RESPIRATION RATE: 16 BRPM | HEART RATE: 88 BPM | TEMPERATURE: 98.5 F | SYSTOLIC BLOOD PRESSURE: 131 MMHG | OXYGEN SATURATION: 99 %

## 2023-01-03 DIAGNOSIS — S92.422A DISPLACED FRACTURE OF DISTAL PHALANX OF LEFT GREAT TOE, INITIAL ENCOUNTER FOR CLOSED FRACTURE: Primary | ICD-10-CM

## 2023-01-03 PROCEDURE — 99283 EMERGENCY DEPT VISIT LOW MDM: CPT

## 2023-01-03 PROCEDURE — 73630 X-RAY EXAM OF FOOT: CPT

## 2023-01-03 ASSESSMENT — PAIN DESCRIPTION - ORIENTATION: ORIENTATION: LEFT

## 2023-01-03 ASSESSMENT — PAIN DESCRIPTION - LOCATION: LOCATION: TOE (COMMENT WHICH ONE)

## 2023-01-03 ASSESSMENT — PAIN - FUNCTIONAL ASSESSMENT: PAIN_FUNCTIONAL_ASSESSMENT: 0-10

## 2023-01-03 ASSESSMENT — PAIN SCALES - GENERAL: PAINLEVEL_OUTOF10: 8

## 2023-01-03 NOTE — Clinical Note
Robinson Resendiz was seen and treated in our emergency department on 1/3/2023. He may return to work on 01/04/2023. He may not lift anything over 10 lbs until cleared by podiatry. If you have any questions or concerns, please don't hesitate to call.       BRENNA Donis

## 2023-01-04 NOTE — ED PROVIDER NOTES
[address] ED  eMERGENCY dEPARTMENT eNCOUnter   Independent Attestation     Pt Name: Erica Shelley  MRN: [id number]  [place] 1990  Date of evaluation: 1/3/23       Erica Shelley is a 27 y.o. male who presents with Toe Injury (Washing machine landed of left foot, great toe during moving it down stairs. )        Based on the medical record, the care appears appropriate. I was personally available for consultation in the Emergency Department.     Meg Stearns MD  Attending Emergency  Physician               Meg Stearns MD  01/03/23 2029

## 2023-01-04 NOTE — ED PROVIDER NOTES
EMERGENCY DEPARTMENT ENCOUNTER    Pt Name: Jay Wyman  MRN: [id number]  [place] 1990  Date of evaluation: 1/3/23  CHIEF COMPLAINT       Chief Complaint   Patient presents with    Toe Injury     Washing machine landed of left foot, great toe during moving it down stairs. HISTORY OF PRESENT ILLNESS   HPI  Patient presents for evaluation of a left great toe injury which he sustained around 5:45 this evening while moving a washing machine. The corner of the washing machine fell onto the tip of his toe. He noticed immediate bruising, swelling, and pain. He did not sustain any open wounds. He notes slightly decreased sensation in the injured toe. He has not taken anything for pain yet but does not want anything at this time. PASTMEDICAL HISTORY     Past Medical History:   Diagnosis Date    Bipolar 1 disorder ([facility] [address]     Depression     PTSD (post-traumatic stress disorder)      Patient Active Problem List   Diagnosis Code    Depression with suicidal ideation F32. A, R45.851    Severe episode of recurrent major depressive disorder, without psychotic features ([facility] [address] F33.2    Bipolar depression ([facility] [address] F31.9     SURGICAL HISTORY       Past Surgical History:   Procedure Laterality Date    KNEE ARTHROSCOPY      TONSILLECTOMY       CURRENT MEDICATIONS       No current facility-administered medications on file prior to encounter. Current Outpatient Medications on File Prior to Encounter   Medication Sig Dispense Refill    traZODone (DESYREL) 50 MG tablet Take 1 tablet by mouth nightly as needed for Sleep 30 tablet 0    FLUoxetine (PROZAC) 10 MG capsule Take 1 capsule by mouth daily 30 capsule 3    OLANZapine (ZYPREXA) 5 MG tablet Take 1 tablet by mouth nightly 30 tablet 3     ALLERGIES     is allergic to ibuprofen. FAMILY HISTORY     has no family status information on file.       SOCIAL HISTORY       Social History     Tobacco Use    Smoking status: Every Day     Packs/day: 1.50     Years: 20.00     Pack years: 30.00     Types: Cigarettes    Smokeless tobacco: Never    Tobacco comments:     goal to quit in 2022    Vaping Use    Vaping Use: Never used   Substance Use Topics    Alcohol use: Not Currently     Comment: rarely    Drug use: Not Currently     PHYSICAL EXAM       ED Triage Vitals [01/03/23 1928]   BP Temp Temp Source Heart Rate Resp SpO2 Height Weight   131/63 98.5 °F (36.9 °C) Oral 88 16 99 % 5' 10\" (1.778 m) 155 lb (70.3 kg)     Nursing note and vitals reviewed  General: Patient is alert and oriented, no acute distress, well-developed, well-nourished   HEENT: Normocephalic and atraumatic  Heart: RRR  Lungs: Respirations even and symmetric  Musculoskeletal: There is visible swelling and ecchymosis of the left great toe, no nail damage / deformity noted, no open wounds, he is tender to palpation particularly at the IP joint, digits are warm and well perfused  Neurological: Normal strength and tone, no focal deficits noted  Skin: Skin is warm and dry  Psychiatric: Normal mood and affect, cooperative, appropriate    MEDICAL DECISION MAKING AND ED COURSE:   1)  Number and Complexity of Problems  Problem List This Visit:  left great toe injury  Differential Diagnosis: fracture, contusion    2)  Data Reviewed (Lab and radiology tests/orders below in next section)    3)  Treatment and Disposition     Patient repeat assessment:  Patient reassessed after application of walking boot. Reported able to walk without pain in the boot. Disposition discussion with patient/family:  diagnosis and treatment plan discussed. Recommend walking boot, work restrictions, f/u with podiatry. Tylenol for pain prn. Anticipatory guidance provided regarding reasons to return to the ED. Patient verbalized understanding of the plan and all questions were addressed.     DATA FOR LAB AND RADIOLOGY TESTS ORDERED BELOW ARE REVIEWED BY THE ED CLINICIAN:    RADIOLOGY: All x-rays, CT, MRI, and formal ultrasound images (except ED bedside ultrasound) are read by the radiologist, see reports below, unless otherwise noted in MDM or here. Reports below are reviewed by myself. XR FOOT LEFT (MIN 3 VIEWS)   Final Result   Nondisplaced fracture proximal aspect distal phalanx             LABS: Lab orders shown below, the results are reviewed by myself, and all abnormals are listed below. Labs Reviewed - No data to display    Vitals Reviewed:    Vitals:    01/03/23 1928   BP: 131/63   Pulse: 88   Resp: 16   Temp: 98.5 °F (36.9 °C)   TempSrc: Oral   SpO2: 99%   Weight: 155 lb (70.3 kg)   Height: 5' 10\" (1.778 m)     MEDICATIONS GIVEN TO PATIENT THIS ENCOUNTER:  No orders of the defined types were placed in this encounter. DISCHARGE PRESCRIPTIONS:  Discharge Medication List as of 1/3/2023  9:03 PM        PHYSICIAN CONSULTS ORDERED THIS ENCOUNTER:  None  FINAL IMPRESSION      1. Displaced fracture of distal phalanx of left great toe, initial encounter for closed fracture        DISPOSITION/PLAN   DISPOSITION Decision To Discharge 01/03/2023 08:53:37 PM      PATIENT REFERRED TO:  Jay Sheehan DPM  [address] [place] [address]  [phone]    Schedule an appointment as soon as possible for a visit       The care is provided during an unprecedented national emergency due to the novel coronavirus, COVID 19.   Michela Miller PA-C, Rainer Almaraz [address] [place] [address]  01/04/23 2480

## 2023-01-05 ENCOUNTER — TELEPHONE (OUTPATIENT)
Dept: PODIATRY | Age: 33
End: 2023-01-05

## 2023-04-05 ENCOUNTER — HOSPITAL ENCOUNTER (EMERGENCY)
Age: 33
Discharge: HOME | End: 2023-04-05
Attending: EMERGENCY MEDICINE
Payer: COMMERCIAL

## 2023-04-05 VITALS
HEIGHT: 70 IN | OXYGEN SATURATION: 99 % | WEIGHT: 155 LBS | HEART RATE: 83 BPM | BODY MASS INDEX: 22.19 KG/M2 | DIASTOLIC BLOOD PRESSURE: 70 MMHG | TEMPERATURE: 98 F | SYSTOLIC BLOOD PRESSURE: 110 MMHG | RESPIRATION RATE: 16 BRPM

## 2023-04-05 DIAGNOSIS — G43.109 MIGRAINE WITH AURA AND WITHOUT STATUS MIGRAINOSUS, NOT INTRACTABLE: Primary | ICD-10-CM

## 2023-04-05 PROCEDURE — 2580000003 HC RX 258: Performed by: EMERGENCY MEDICINE

## 2023-04-05 PROCEDURE — 6360000002 HC RX W HCPCS: Performed by: EMERGENCY MEDICINE

## 2023-04-05 RX ORDER — 0.9 % SODIUM CHLORIDE 0.9 %
1000 INTRAVENOUS SOLUTION INTRAVENOUS ONCE
Status: COMPLETED | OUTPATIENT
Start: 2023-04-05 | End: 2023-04-05

## 2023-04-05 RX ORDER — PROCHLORPERAZINE EDISYLATE 5 MG/ML
10 INJECTION INTRAMUSCULAR; INTRAVENOUS EVERY 6 HOURS PRN
Status: DISCONTINUED | OUTPATIENT
Start: 2023-04-05 | End: 2023-04-05 | Stop reason: HOSPADM

## 2023-04-05 RX ORDER — DIPHENHYDRAMINE HYDROCHLORIDE 50 MG/ML
25 INJECTION INTRAMUSCULAR; INTRAVENOUS ONCE
Status: COMPLETED | OUTPATIENT
Start: 2023-04-05 | End: 2023-04-05

## 2023-04-05 RX ADMIN — PROCHLORPERAZINE EDISYLATE 10 MG: 5 INJECTION INTRAMUSCULAR; INTRAVENOUS at 10:58

## 2023-04-05 RX ADMIN — DIPHENHYDRAMINE HYDROCHLORIDE 25 MG: 50 INJECTION, SOLUTION INTRAMUSCULAR; INTRAVENOUS at 10:54

## 2023-04-05 RX ADMIN — SODIUM CHLORIDE 1000 ML: 9 INJECTION, SOLUTION INTRAVENOUS at 10:53

## 2023-04-05 ASSESSMENT — ENCOUNTER SYMPTOMS
COUGH: 0
EYE REDNESS: 0
ABDOMINAL PAIN: 0
SHORTNESS OF BREATH: 0
SORE THROAT: 0
DIARRHEA: 0
BACK PAIN: 0
VOMITING: 0
EYE PAIN: 0

## 2023-04-05 ASSESSMENT — PAIN - FUNCTIONAL ASSESSMENT
PAIN_FUNCTIONAL_ASSESSMENT: NONE - DENIES PAIN
PAIN_FUNCTIONAL_ASSESSMENT: 0-10

## 2023-04-05 ASSESSMENT — PAIN SCALES - GENERAL: PAINLEVEL_OUTOF10: 10

## 2023-04-05 NOTE — ED TRIAGE NOTES
Mode of arrival (squad #, walk in, police, etc) : walk-in        Chief complaint(s): migraine, nausea        Arrival Note (brief scenario, treatment PTA, etc). : Pt reports migraine and nausea x1 day that is not relieved by OTC medications. Pt reports frequent migraines but does not take medication for it. C= \"Have you ever felt that you should Cut down on your drinking? \"  No  A= \"Have people Annoyed you by criticizing your drinking? \"  No  G= \"Have you ever felt bad or Guilty about your drinking? \"  No  E= \"Have you ever had a drink as an Eye-opener first thing in the morning to steady your nerves or to help a hangover? \"  No      Deferred []      Reason for deferring: N/A    *If yes to two or more: probable alcohol abuse. *

## 2023-04-05 NOTE — ED PROVIDER NOTES
EMERGENCY DEPARTMENT ENCOUNTER    Pt Name: Susu Abel  MRN: [id number]  [place] 1990  Date of evaluation: 4/5/23  CHIEF COMPLAINT       Chief Complaint   Patient presents with    Migraine    Nausea     HISTORY OF PRESENT ILLNESS   Patient is 47-year-old male who presents with migraine headache. Patient states headache began yesterday at 5 AM.  Patient states he has taken Aleve for the pain. Patient has history of migraine headaches. Patient has had an MRI in the past.  Patient states photophobia. Pain feels similar to previous migraine headaches. REVIEW OF SYSTEMS     Review of Systems   Constitutional:  Negative for fever. HENT:  Negative for nosebleeds and sore throat. Eyes:  Negative for pain and redness. Respiratory:  Negative for cough and shortness of breath. Cardiovascular:  Negative for chest pain. Gastrointestinal:  Negative for abdominal pain, diarrhea and vomiting. Genitourinary:  Negative for dysuria and frequency. Musculoskeletal:  Negative for arthralgias and back pain. Neurological:  Positive for headaches. Negative for dizziness and weakness. All other systems reviewed and are negative. PASTMEDICAL HISTORY     Past Medical History:   Diagnosis Date    Bipolar 1 disorder ([address]     Depression     PTSD (post-traumatic stress disorder)      Past Problem List  Patient Active Problem List   Diagnosis Code    Depression with suicidal ideation F32. A, R45.851    Severe episode of recurrent major depressive disorder, without psychotic features ([address] F33.2    Bipolar depression ([facility] [address] F31.9     SURGICAL HISTORY       Past Surgical History:   Procedure Laterality Date    KNEE ARTHROSCOPY      TONSILLECTOMY       CURRENT MEDICATIONS       Previous Medications    FLUOXETINE (PROZAC) 10 MG CAPSULE    Take 1 capsule by mouth daily    OLANZAPINE (ZYPREXA) 5 MG TABLET    Take 1 tablet by mouth nightly    TRAZODONE (DESYREL) 50 MG TABLET    Take 1 tablet by mouth nightly as

## 2023-05-16 NOTE — TELEPHONE ENCOUNTER
The patient called to see about getting an appointment since the [place] ER gave him a referral for a broken toe. Dr. Montrell Walton looked at the x-rays from the ER, and had me tell the patient to continue to wear the 'shoe' that he was dispensed for the next month. He does not need an appointment at this time. If he continues to have an issue at that time, he will call the office and let us know.
none

## 2023-05-23 ENCOUNTER — HOSPITAL ENCOUNTER (EMERGENCY)
Age: 33
Discharge: HOME | End: 2023-05-23
Attending: EMERGENCY MEDICINE
Payer: COMMERCIAL

## 2023-05-23 ENCOUNTER — APPOINTMENT (OUTPATIENT)
Dept: GENERAL RADIOLOGY | Age: 33
End: 2023-05-23
Payer: COMMERCIAL

## 2023-05-23 VITALS
TEMPERATURE: 98.7 F | HEART RATE: 71 BPM | WEIGHT: 157 LBS | BODY MASS INDEX: 22.48 KG/M2 | HEIGHT: 70 IN | DIASTOLIC BLOOD PRESSURE: 80 MMHG | OXYGEN SATURATION: 100 % | SYSTOLIC BLOOD PRESSURE: 138 MMHG | RESPIRATION RATE: 20 BRPM

## 2023-05-23 DIAGNOSIS — R07.9 CHEST PAIN, UNSPECIFIED TYPE: Primary | ICD-10-CM

## 2023-05-23 DIAGNOSIS — J40 BRONCHITIS: ICD-10-CM

## 2023-05-23 LAB
ALBUMIN SERPL-MCNC: 4.1 G/DL (ref 3.5–5.2)
ALP SERPL-CCNC: 72 U/L (ref 40–129)
ALT SERPL-CCNC: 11 U/L (ref 5–41)
ANION GAP SERPL CALCULATED.3IONS-SCNC: 8 MMOL/L (ref 9–17)
AST SERPL-CCNC: 19 U/L
BASOPHILS # BLD: 0 K/UL (ref 0–0.2)
BASOPHILS NFR BLD: 0 % (ref 0–2)
BILIRUB SERPL-MCNC: 0.2 MG/DL (ref 0.3–1.2)
BUN SERPL-MCNC: 6 MG/DL (ref 6–20)
CALCIUM SERPL-MCNC: 9.3 MG/DL (ref 8.6–10.4)
CHLORIDE SERPL-SCNC: 109 MMOL/L (ref 98–107)
CO2 SERPL-SCNC: 25 MMOL/L (ref 20–31)
CREAT SERPL-MCNC: 0.92 MG/DL (ref 0.7–1.2)
D DIMER PPP FEU-MCNC: 0.44 UG/ML FEU (ref 0–0.59)
EOSINOPHIL # BLD: 0.1 K/UL (ref 0–0.4)
EOSINOPHILS RELATIVE PERCENT: 2 % (ref 0–4)
ERYTHROCYTE [DISTWIDTH] IN BLOOD BY AUTOMATED COUNT: 14 % (ref 11.5–14.9)
FLUAV RNA RESP QL NAA+PROBE: NOT DETECTED
FLUBV RNA RESP QL NAA+PROBE: NOT DETECTED
GFR SERPL CREATININE-BSD FRML MDRD: >60 ML/MIN/1.73M2
GLUCOSE SERPL-MCNC: 104 MG/DL (ref 70–99)
HCT VFR BLD AUTO: 44.4 % (ref 41–53)
HGB BLD-MCNC: 14.8 G/DL (ref 13.5–17.5)
INR PPP: 0.9
LIPASE SERPL-CCNC: 32 U/L (ref 13–60)
LYMPHOCYTES # BLD: 14 % (ref 24–44)
LYMPHOCYTES NFR BLD: 1.2 K/UL (ref 1–4.8)
MAGNESIUM SERPL-MCNC: 2.2 MG/DL (ref 1.6–2.6)
MCH RBC QN AUTO: 28.9 PG (ref 26–34)
MCHC RBC AUTO-ENTMCNC: 33.4 G/DL (ref 31–37)
MCV RBC AUTO: 86.7 FL (ref 80–100)
MONOCYTES NFR BLD: 0.9 K/UL (ref 0.1–1.3)
MONOCYTES NFR BLD: 10 % (ref 1–7)
NEUTROPHILS NFR BLD: 74 % (ref 36–66)
NEUTS SEG NFR BLD: 6.7 K/UL (ref 1.3–9.1)
PLATELET # BLD AUTO: 267 K/UL (ref 150–450)
PMV BLD AUTO: 7.8 FL (ref 6–12)
POTASSIUM SERPL-SCNC: 4.1 MMOL/L (ref 3.7–5.3)
PROT SERPL-MCNC: 6.7 G/DL (ref 6.4–8.3)
PROTHROMBIN TIME: 12.4 SEC (ref 11.8–14.6)
RBC # BLD AUTO: 5.13 M/UL (ref 4.5–5.9)
SARS-COV-2 RNA RESP QL NAA+PROBE: NOT DETECTED
SODIUM SERPL-SCNC: 142 MMOL/L (ref 135–144)
SOURCE: NORMAL
SPECIMEN DESCRIPTION: NORMAL
TROPONIN I SERPL HS-MCNC: <6 NG/L (ref 0–22)
TROPONIN I SERPL HS-MCNC: <6 NG/L (ref 0–22)
WBC OTHER # BLD: 9 K/UL (ref 3.5–11)

## 2023-05-23 PROCEDURE — 85025 COMPLETE CBC W/AUTO DIFF WBC: CPT

## 2023-05-23 PROCEDURE — 85610 PROTHROMBIN TIME: CPT

## 2023-05-23 PROCEDURE — 6370000000 HC RX 637 (ALT 250 FOR IP): Performed by: EMERGENCY MEDICINE

## 2023-05-23 PROCEDURE — 84484 ASSAY OF TROPONIN QUANT: CPT

## 2023-05-23 PROCEDURE — 99285 EMERGENCY DEPT VISIT HI MDM: CPT

## 2023-05-23 PROCEDURE — 6360000002 HC RX W HCPCS: Performed by: EMERGENCY MEDICINE

## 2023-05-23 PROCEDURE — 85379 FIBRIN DEGRADATION QUANT: CPT

## 2023-05-23 PROCEDURE — 87636 SARSCOV2 & INF A&B AMP PRB: CPT

## 2023-05-23 PROCEDURE — 94761 N-INVAS EAR/PLS OXIMETRY MLT: CPT

## 2023-05-23 PROCEDURE — 93005 ELECTROCARDIOGRAM TRACING: CPT | Performed by: EMERGENCY MEDICINE

## 2023-05-23 PROCEDURE — 83735 ASSAY OF MAGNESIUM: CPT

## 2023-05-23 PROCEDURE — 36415 COLL VENOUS BLD VENIPUNCTURE: CPT

## 2023-05-23 PROCEDURE — 71045 X-RAY EXAM CHEST 1 VIEW: CPT

## 2023-05-23 PROCEDURE — 94640 AIRWAY INHALATION TREATMENT: CPT

## 2023-05-23 PROCEDURE — 80053 COMPREHEN METABOLIC PANEL: CPT

## 2023-05-23 PROCEDURE — 83690 ASSAY OF LIPASE: CPT

## 2023-05-23 RX ORDER — IPRATROPIUM BROMIDE AND ALBUTEROL SULFATE 2.5; .5 MG/3ML; MG/3ML
1 SOLUTION RESPIRATORY (INHALATION) EVERY 4 HOURS PRN
Status: DISCONTINUED | OUTPATIENT
Start: 2023-05-23 | End: 2023-05-23 | Stop reason: HOSPADM

## 2023-05-23 RX ORDER — METHYLPREDNISOLONE SODIUM SUCCINATE 125 MG/2ML
125 INJECTION, POWDER, LYOPHILIZED, FOR SOLUTION INTRAMUSCULAR; INTRAVENOUS ONCE
Status: COMPLETED | OUTPATIENT
Start: 2023-05-23 | End: 2023-05-23

## 2023-05-23 RX ORDER — PREDNISONE 20 MG/1
40 TABLET ORAL DAILY
Qty: 10 TABLET | Refills: 0 | Status: SHIPPED | OUTPATIENT
Start: 2023-05-23 | End: 2023-05-28

## 2023-05-23 RX ORDER — ALBUTEROL SULFATE 90 UG/1
2 AEROSOL, METERED RESPIRATORY (INHALATION) 4 TIMES DAILY PRN
Qty: 18 G | Refills: 0 | Status: SHIPPED | OUTPATIENT
Start: 2023-05-23

## 2023-05-23 RX ADMIN — METHYLPREDNISOLONE SODIUM SUCCINATE 125 MG: 125 INJECTION, POWDER, FOR SOLUTION INTRAMUSCULAR; INTRAVENOUS at 11:06

## 2023-05-23 RX ADMIN — IPRATROPIUM BROMIDE AND ALBUTEROL SULFATE 1 AMPULE: .5; 2.5 SOLUTION RESPIRATORY (INHALATION) at 11:17

## 2023-05-23 ASSESSMENT — LIFESTYLE VARIABLES
HOW MANY STANDARD DRINKS CONTAINING ALCOHOL DO YOU HAVE ON A TYPICAL DAY: PATIENT DOES NOT DRINK
HOW OFTEN DO YOU HAVE A DRINK CONTAINING ALCOHOL: NEVER

## 2023-05-23 ASSESSMENT — ENCOUNTER SYMPTOMS
BACK PAIN: 0
COUGH: 1
EYE PAIN: 0
ABDOMINAL PAIN: 0
COLOR CHANGE: 0
SHORTNESS OF BREATH: 1

## 2023-05-23 ASSESSMENT — HEART SCORE: ECG: 0

## 2023-05-23 ASSESSMENT — PAIN - FUNCTIONAL ASSESSMENT: PAIN_FUNCTIONAL_ASSESSMENT: 0-10

## 2023-05-23 ASSESSMENT — PAIN SCALES - GENERAL: PAINLEVEL_OUTOF10: 8

## 2023-05-23 NOTE — ED NOTES
Pt comes to this ER with c/o constant midsternal chest \"burning\" discomfort, nonproductive cough, and SOB with exertion. Pt states his nonproductive cough started Sunday, and his chest discomfort and SOB started last night. Pt arrives A+O x 4, GCS = 15, PMS x 4 intact, eupneic, and PWD. Pulse is regular et strong with s1 and s2 heart tones. Lung sounds clear t/o bilat upper lung fields and coarse bilat bases. Pt speaks in complete sentences without difficulty. Pt also reports clear nasal drainage that \"has been going on for years. \"     Mariya Espinosa, RN  05/23/23 7302

## 2023-05-23 NOTE — ED PROVIDER NOTES
EMERGENCY DEPARTMENT ENCOUNTER    Pt Name: Laine Lang  MRN: [id number]  [place] 1990  Date of evaluation: 5/23/23  CHIEF COMPLAINT       Chief Complaint   Patient presents with    Chest Pain    Shortness of Breath     HISTORY OF PRESENT ILLNESS   29-year-old male presents for complaints of chest pain, shortness of breath, and cough. He reports that he initially developed symptoms last night. States it started when he was laying down. Describes the pain as burning in the center of his chest denies radiation of pain, states it is worse with coughing and deep breaths. He reports he been sick with upper respiratory infection over the last day or so. He denies any significant cardiac history history of hypertension or diabetes, he does report a smoking history and family history of cardiac disease. He denies any leg pain or swelling denies any associated fevers or chills    The history is provided by the patient. REVIEW OF SYSTEMS     Review of Systems   Constitutional:  Negative for chills and fever. HENT:  Negative for congestion and ear pain. Eyes:  Negative for pain. Respiratory:  Positive for cough and shortness of breath. Cardiovascular:  Positive for chest pain. Negative for palpitations and leg swelling. Gastrointestinal:  Negative for abdominal pain. Genitourinary:  Negative for dysuria and flank pain. Musculoskeletal:  Negative for back pain. Skin:  Negative for color change. Neurological:  Negative for numbness and headaches. Psychiatric/Behavioral:  Negative for confusion. All other systems reviewed and are negative. PASTMEDICAL HISTORY     Past Medical History:   Diagnosis Date    Bipolar 1 disorder ([facility] [address]     Depression     Headache     PTSD (post-traumatic stress disorder)      Past Problem List  Patient Active Problem List   Diagnosis Code    Depression with suicidal ideation F32. A, R45.851    Severe episode of recurrent major depressive disorder, without

## 2023-05-24 LAB
EKG ATRIAL RATE: 81 BPM
EKG P AXIS: 82 DEGREES
EKG P-R INTERVAL: 158 MS
EKG Q-T INTERVAL: 388 MS
EKG QRS DURATION: 114 MS
EKG QTC CALCULATION (BAZETT): 450 MS
EKG R AXIS: 79 DEGREES
EKG T AXIS: 77 DEGREES
EKG VENTRICULAR RATE: 81 BPM

## 2023-05-24 PROCEDURE — 93010 ELECTROCARDIOGRAM REPORT: CPT | Performed by: INTERNAL MEDICINE

## 2024-01-12 ENCOUNTER — APPOINTMENT (OUTPATIENT)
Dept: ULTRASOUND IMAGING | Age: 34
End: 2024-01-12

## 2024-01-12 ENCOUNTER — HOSPITAL ENCOUNTER (EMERGENCY)
Age: 34
Discharge: HOME | End: 2024-01-12
Attending: EMERGENCY MEDICINE

## 2024-01-12 ENCOUNTER — APPOINTMENT (OUTPATIENT)
Dept: CT IMAGING | Age: 34
End: 2024-01-12

## 2024-01-12 VITALS
TEMPERATURE: 98.8 F | HEART RATE: 118 BPM | OXYGEN SATURATION: 99 % | HEIGHT: 70 IN | SYSTOLIC BLOOD PRESSURE: 111 MMHG | RESPIRATION RATE: 18 BRPM | BODY MASS INDEX: 21.76 KG/M2 | WEIGHT: 152 LBS | DIASTOLIC BLOOD PRESSURE: 65 MMHG

## 2024-01-12 DIAGNOSIS — N50.812 PAIN IN LEFT TESTICLE: Primary | ICD-10-CM

## 2024-01-12 LAB
ALBUMIN SERPL-MCNC: 4.1 G/DL (ref 3.5–5.2)
ALP SERPL-CCNC: 68 U/L (ref 40–129)
ALT SERPL-CCNC: 8 U/L (ref 5–41)
ANION GAP SERPL CALCULATED.3IONS-SCNC: 9 MMOL/L (ref 9–17)
AST SERPL-CCNC: 13 U/L
BASOPHILS # BLD: 0.1 K/UL (ref 0–0.2)
BASOPHILS NFR BLD: 1 % (ref 0–2)
BILIRUB SERPL-MCNC: 0.4 MG/DL (ref 0.3–1.2)
BILIRUB UR QL STRIP: NEGATIVE
BUN SERPL-MCNC: 10 MG/DL (ref 6–20)
CALCIUM SERPL-MCNC: 9.5 MG/DL (ref 8.6–10.4)
CHLORIDE SERPL-SCNC: 102 MMOL/L (ref 98–107)
CLARITY UR: CLEAR
CO2 SERPL-SCNC: 28 MMOL/L (ref 20–31)
COLOR UR: YELLOW
COMMENT: NORMAL
CREAT SERPL-MCNC: 1.1 MG/DL (ref 0.7–1.2)
EOSINOPHIL # BLD: 0.1 K/UL (ref 0–0.4)
EOSINOPHILS RELATIVE PERCENT: 1 % (ref 0–4)
ERYTHROCYTE [DISTWIDTH] IN BLOOD BY AUTOMATED COUNT: 14.1 % (ref 11.5–14.9)
GFR SERPL CREATININE-BSD FRML MDRD: >60 ML/MIN/1.73M2
GLUCOSE SERPL-MCNC: 102 MG/DL (ref 70–99)
GLUCOSE UR STRIP-MCNC: NEGATIVE MG/DL
HCT VFR BLD AUTO: 48.6 % (ref 41–53)
HGB BLD-MCNC: 15.7 G/DL (ref 13.5–17.5)
HGB UR QL STRIP.AUTO: NEGATIVE
KETONES UR STRIP-MCNC: NEGATIVE MG/DL
LEUKOCYTE ESTERASE UR QL STRIP: NEGATIVE
LYMPHOCYTES NFR BLD: 1.9 K/UL (ref 1–4.8)
LYMPHOCYTES RELATIVE PERCENT: 15 % (ref 24–44)
MCH RBC QN AUTO: 28.4 PG (ref 26–34)
MCHC RBC AUTO-ENTMCNC: 32.4 G/DL (ref 31–37)
MCV RBC AUTO: 87.6 FL (ref 80–100)
MONOCYTES NFR BLD: 1.2 K/UL (ref 0.1–1.3)
MONOCYTES NFR BLD: 10 % (ref 1–7)
NEUTROPHILS NFR BLD: 73 % (ref 36–66)
NEUTS SEG NFR BLD: 9.4 K/UL (ref 1.3–9.1)
NITRITE UR QL STRIP: NEGATIVE
PH UR STRIP: 6.5 [PH] (ref 5–8)
PLATELET # BLD AUTO: 284 K/UL (ref 150–450)
PMV BLD AUTO: 8.1 FL (ref 6–12)
POTASSIUM SERPL-SCNC: 4.5 MMOL/L (ref 3.7–5.3)
PROT SERPL-MCNC: 6.4 G/DL (ref 6.4–8.3)
PROT UR STRIP-MCNC: NEGATIVE MG/DL
RBC # BLD AUTO: 5.55 M/UL (ref 4.5–5.9)
SODIUM SERPL-SCNC: 139 MMOL/L (ref 135–144)
SP GR UR STRIP: 1.01 (ref 1–1.03)
UROBILINOGEN UR STRIP-ACNC: NORMAL EU/DL (ref 0–1)
WBC OTHER # BLD: 12.6 K/UL (ref 3.5–11)

## 2024-01-12 PROCEDURE — 2580000003 HC RX 258

## 2024-01-12 PROCEDURE — 96372 THER/PROPH/DIAG INJ SC/IM: CPT

## 2024-01-12 PROCEDURE — 6370000000 HC RX 637 (ALT 250 FOR IP): Performed by: EMERGENCY MEDICINE

## 2024-01-12 PROCEDURE — 74176 CT ABD & PELVIS W/O CONTRAST: CPT

## 2024-01-12 PROCEDURE — 96374 THER/PROPH/DIAG INJ IV PUSH: CPT

## 2024-01-12 PROCEDURE — 81003 URINALYSIS AUTO W/O SCOPE: CPT

## 2024-01-12 PROCEDURE — 80053 COMPREHEN METABOLIC PANEL: CPT

## 2024-01-12 PROCEDURE — 76870 US EXAM SCROTUM: CPT

## 2024-01-12 PROCEDURE — 87591 N.GONORRHOEAE DNA AMP PROB: CPT

## 2024-01-12 PROCEDURE — 85025 COMPLETE CBC W/AUTO DIFF WBC: CPT

## 2024-01-12 PROCEDURE — 36415 COLL VENOUS BLD VENIPUNCTURE: CPT

## 2024-01-12 PROCEDURE — 87491 CHLMYD TRACH DNA AMP PROBE: CPT

## 2024-01-12 PROCEDURE — 99284 EMERGENCY DEPT VISIT MOD MDM: CPT

## 2024-01-12 PROCEDURE — 6360000002 HC RX W HCPCS: Performed by: EMERGENCY MEDICINE

## 2024-01-12 RX ORDER — KETOROLAC TROMETHAMINE 30 MG/ML
15 INJECTION, SOLUTION INTRAMUSCULAR; INTRAVENOUS ONCE
Status: COMPLETED | OUTPATIENT
Start: 2024-01-12 | End: 2024-01-12

## 2024-01-12 RX ORDER — DOXYCYCLINE HYCLATE 100 MG
100 TABLET ORAL 2 TIMES DAILY
Qty: 20 TABLET | Refills: 0 | Status: SHIPPED | OUTPATIENT
Start: 2024-01-12 | End: 2024-01-22

## 2024-01-12 RX ORDER — CEFTRIAXONE 500 MG/1
500 INJECTION, POWDER, FOR SOLUTION INTRAMUSCULAR; INTRAVENOUS ONCE
Status: COMPLETED | OUTPATIENT
Start: 2024-01-12 | End: 2024-01-12

## 2024-01-12 RX ORDER — METRONIDAZOLE 500 MG/1
500 TABLET ORAL 2 TIMES DAILY
Qty: 20 TABLET | Refills: 0 | Status: SHIPPED | OUTPATIENT
Start: 2024-01-12 | End: 2024-01-22

## 2024-01-12 RX ORDER — DOXYCYCLINE 100 MG/1
100 CAPSULE ORAL ONCE
Status: COMPLETED | OUTPATIENT
Start: 2024-01-12 | End: 2024-01-12

## 2024-01-12 RX ORDER — WATER 10 ML/10ML
INJECTION INTRAMUSCULAR; INTRAVENOUS; SUBCUTANEOUS
Status: COMPLETED
Start: 2024-01-12 | End: 2024-01-12

## 2024-01-12 RX ORDER — ACETAMINOPHEN 500 MG
1000 TABLET ORAL EVERY 6 HOURS PRN
Qty: 60 TABLET | Refills: 0 | Status: SHIPPED | OUTPATIENT
Start: 2024-01-12

## 2024-01-12 RX ORDER — METRONIDAZOLE 500 MG/1
500 TABLET ORAL EVERY 8 HOURS SCHEDULED
Status: DISCONTINUED | OUTPATIENT
Start: 2024-01-12 | End: 2024-01-12 | Stop reason: HOSPADM

## 2024-01-12 RX ADMIN — METRONIDAZOLE 500 MG: 500 TABLET ORAL at 11:50

## 2024-01-12 RX ADMIN — WATER 10 ML: 1 INJECTION INTRAMUSCULAR; INTRAVENOUS; SUBCUTANEOUS at 11:52

## 2024-01-12 RX ADMIN — DOXYCYCLINE 100 MG: 100 CAPSULE ORAL at 11:50

## 2024-01-12 RX ADMIN — CEFTRIAXONE SODIUM 500 MG: 500 INJECTION, POWDER, FOR SOLUTION INTRAMUSCULAR; INTRAVENOUS at 11:50

## 2024-01-12 RX ADMIN — KETOROLAC TROMETHAMINE 15 MG: 30 INJECTION INTRAMUSCULAR; INTRAVENOUS at 10:37

## 2024-01-12 ASSESSMENT — PAIN DESCRIPTION - ORIENTATION
ORIENTATION: LEFT
ORIENTATION: LEFT

## 2024-01-12 ASSESSMENT — PAIN DESCRIPTION - LOCATION
LOCATION: SCROTUM
LOCATION: SCROTUM

## 2024-01-12 ASSESSMENT — PAIN DESCRIPTION - DESCRIPTORS: DESCRIPTORS: ACHING;STABBING

## 2024-01-12 ASSESSMENT — PAIN DESCRIPTION - PAIN TYPE: TYPE: ACUTE PAIN

## 2024-01-12 ASSESSMENT — PAIN SCALES - GENERAL: PAINLEVEL_OUTOF10: 8

## 2024-01-12 ASSESSMENT — PAIN - FUNCTIONAL ASSESSMENT: PAIN_FUNCTIONAL_ASSESSMENT: 0-10

## 2024-01-12 ASSESSMENT — LIFESTYLE VARIABLES
HOW OFTEN DO YOU HAVE A DRINK CONTAINING ALCOHOL: NEVER
HOW MANY STANDARD DRINKS CONTAINING ALCOHOL DO YOU HAVE ON A TYPICAL DAY: PATIENT DOES NOT DRINK

## 2024-01-12 NOTE — ED NOTES
Report given to KEYSHA Lindsey from ED.   Report method in person   The following was reviewed with receiving RN:   Current vital signs:  /65   Pulse (!) 118   Temp 98.8 °F (37.1 °C) (Oral)   Resp 18   Ht 1.778 m (5' 10\")   Wt 68.9 kg (152 lb)   SpO2 99%   BMI 21.81 kg/m²                MEWS Score: 3     Any medication or safety alerts were reviewed. Any pending diagnostics and notifications were also reviewed, as well as any safety concerns or issues, abnormal labs, abnormal imaging, and abnormal assessment findings. Questions were answered.

## 2024-01-12 NOTE — ED TRIAGE NOTES
Mode of arrival (squad #, walk in, police, etc) : walk in         Chief complaint(s): LT testicle pain        Arrival Note (brief scenario, treatment PTA, etc).: onset Tuesday, has had kidney stones in past, does have hernia on RT groin, denies penile drainage        C= \"Have you ever felt that you should Cut down on your drinking?\"  No  A= \"Have people Annoyed you by criticizing your drinking?\"  No  G= \"Have you ever felt bad or Guilty about your drinking?\"  No  E= \"Have you ever had a drink as an Eye-opener first thing in the morning to steady your nerves or to help a hangover?\"  No      Deferred []      Reason for deferring: N/A    *If yes to two or more: probable alcohol abuse.*

## 2024-01-12 NOTE — ED PROVIDER NOTES
Given a work note as well.    DATA FOR LAB AND RADIOLOGY TESTS ORDERED BELOW ARE REVIEWED BY THE ED CLINICIAN:    RADIOLOGY: All x-rays, CT, MRI, and formal ultrasound images (except ED bedside ultrasound) are read by the radiologist, see reports below, unless otherwise noted in MDM or here.  Reports below are reviewed by myself.  CT ABDOMEN PELVIS WO CONTRAST Additional Contrast? None   Final Result   No acute intra-abdominal or pelvic process.  No evidence of urinary tract   calculus or obstruction         US SCROTUM W LIMITED DUPLEX   Final Result   1. Mildly increased Doppler signal in the left testis raise the possibility   of orchitis.   2. Small bilateral hydroceles.   3. Incidental 0.5 cm left epididymal cyst.             LABS: Lab orders shown below, the results are reviewed by myself, and all abnormals are listed below.  Labs Reviewed   CBC WITH AUTO DIFFERENTIAL - Abnormal; Notable for the following components:       Result Value    WBC 12.6 (*)     Neutrophils % 73 (*)     Lymphocytes % 15 (*)     Monocytes % 10 (*)     Neutrophils Absolute 9.40 (*)     All other components within normal limits   COMPREHENSIVE METABOLIC PANEL - Abnormal; Notable for the following components:    Glucose 102 (*)     All other components within normal limits   C.TRACHOMATIS N.GONORRHOEAE DNA, URINE   URINALYSIS WITH REFLEX TO CULTURE       Vitals Reviewed:    Vitals:    01/12/24 0918   BP: 111/65   Pulse: (!) 118   Resp: 18   Temp: 98.8 °F (37.1 °C)   TempSrc: Oral   SpO2: 99%   Weight: 68.9 kg (152 lb)   Height: 1.778 m (5' 10\")     MEDICATIONS GIVEN TO PATIENT THIS ENCOUNTER:  Orders Placed This Encounter   Medications    ketorolac (TORADOL) injection 15 mg    doxycycline monohydrate (MONODOX) capsule 100 mg     Order Specific Question:   Antimicrobial Indications     Answer:   STD infection    metroNIDAZOLE (FLAGYL) tablet 500 mg     Order Specific Question:   Antimicrobial Indications     Answer:   STD infection

## 2024-01-13 LAB
CHLAMYDIA DNA UR QL NAA+PROBE: NEGATIVE
N GONORRHOEA DNA UR QL NAA+PROBE: NEGATIVE
SPECIMEN DESCRIPTION: NORMAL

## 2024-11-20 ENCOUNTER — HOSPITAL ENCOUNTER (EMERGENCY)
Age: 34
Discharge: HOME | End: 2024-11-20
Attending: EMERGENCY MEDICINE
Payer: COMMERCIAL

## 2024-11-20 VITALS
SYSTOLIC BLOOD PRESSURE: 123 MMHG | WEIGHT: 150 LBS | HEART RATE: 77 BPM | HEIGHT: 70 IN | TEMPERATURE: 98.2 F | BODY MASS INDEX: 21.47 KG/M2 | DIASTOLIC BLOOD PRESSURE: 94 MMHG | RESPIRATION RATE: 16 BRPM | OXYGEN SATURATION: 99 %

## 2024-11-20 DIAGNOSIS — R10.13 ABDOMINAL PAIN, EPIGASTRIC: Primary | ICD-10-CM

## 2024-11-20 LAB
ALBUMIN SERPL-MCNC: 3.9 G/DL (ref 3.5–5.2)
ALP SERPL-CCNC: 67 U/L (ref 40–129)
ALT SERPL-CCNC: 9 U/L (ref 10–50)
ANION GAP SERPL CALCULATED.3IONS-SCNC: 9 MMOL/L (ref 9–16)
AST SERPL-CCNC: 19 U/L (ref 10–50)
BASOPHILS # BLD: 0 K/UL (ref 0–0.2)
BASOPHILS NFR BLD: 0 % (ref 0–2)
BILIRUB SERPL-MCNC: 0.2 MG/DL (ref 0–1.2)
BILIRUB UR QL STRIP: NEGATIVE
BUN SERPL-MCNC: 15 MG/DL (ref 6–20)
CALCIUM SERPL-MCNC: 8.8 MG/DL (ref 8.6–10.4)
CHLORIDE SERPL-SCNC: 110 MMOL/L (ref 98–107)
CLARITY UR: CLEAR
CO2 SERPL-SCNC: 27 MMOL/L (ref 20–31)
COLOR UR: YELLOW
COMMENT: NORMAL
CREAT SERPL-MCNC: 1 MG/DL (ref 0.7–1.2)
EOSINOPHIL # BLD: 0.3 K/UL (ref 0–0.4)
EOSINOPHILS RELATIVE PERCENT: 4 % (ref 0–4)
ERYTHROCYTE [DISTWIDTH] IN BLOOD BY AUTOMATED COUNT: 14.3 % (ref 11.5–14.9)
GFR, ESTIMATED: >90 ML/MIN/1.73M2
GLUCOSE SERPL-MCNC: 95 MG/DL (ref 74–99)
GLUCOSE UR STRIP-MCNC: NEGATIVE MG/DL
HCT VFR BLD AUTO: 41.6 % (ref 41–53)
HGB BLD-MCNC: 13.7 G/DL (ref 13.5–17.5)
HGB UR QL STRIP.AUTO: NEGATIVE
KETONES UR STRIP-MCNC: NEGATIVE MG/DL
LEUKOCYTE ESTERASE UR QL STRIP: NEGATIVE
LIPASE SERPL-CCNC: 52 U/L (ref 13–60)
LYMPHOCYTES NFR BLD: 2.6 K/UL (ref 1–4.8)
LYMPHOCYTES RELATIVE PERCENT: 32 % (ref 24–44)
MCH RBC QN AUTO: 29.2 PG (ref 26–34)
MCHC RBC AUTO-ENTMCNC: 32.9 G/DL (ref 31–37)
MCV RBC AUTO: 88.8 FL (ref 80–100)
MONOCYTES NFR BLD: 0.8 K/UL (ref 0.1–1.3)
MONOCYTES NFR BLD: 10 % (ref 1–7)
NEUTROPHILS NFR BLD: 54 % (ref 36–66)
NEUTS SEG NFR BLD: 4.3 K/UL (ref 1.3–9.1)
NITRITE UR QL STRIP: NEGATIVE
PH UR STRIP: 6 [PH] (ref 5–8)
PLATELET # BLD AUTO: 304 K/UL (ref 150–450)
PMV BLD AUTO: 8.1 FL (ref 6–12)
POTASSIUM SERPL-SCNC: 4.4 MMOL/L (ref 3.7–5.3)
PROT SERPL-MCNC: 5.9 G/DL (ref 6.6–8.7)
PROT UR STRIP-MCNC: NEGATIVE MG/DL
RBC # BLD AUTO: 4.68 M/UL (ref 4.5–5.9)
SODIUM SERPL-SCNC: 146 MMOL/L (ref 136–145)
SP GR UR STRIP: 1.02 (ref 1–1.03)
UROBILINOGEN UR STRIP-ACNC: NORMAL EU/DL (ref 0–1)
WBC OTHER # BLD: 8.1 K/UL (ref 3.5–11)

## 2024-11-20 PROCEDURE — 81003 URINALYSIS AUTO W/O SCOPE: CPT

## 2024-11-20 PROCEDURE — 83690 ASSAY OF LIPASE: CPT

## 2024-11-20 PROCEDURE — 80053 COMPREHEN METABOLIC PANEL: CPT

## 2024-11-20 PROCEDURE — 6360000002 HC RX W HCPCS: Performed by: EMERGENCY MEDICINE

## 2024-11-20 PROCEDURE — 36415 COLL VENOUS BLD VENIPUNCTURE: CPT

## 2024-11-20 PROCEDURE — 99284 EMERGENCY DEPT VISIT MOD MDM: CPT

## 2024-11-20 PROCEDURE — 85025 COMPLETE CBC W/AUTO DIFF WBC: CPT

## 2024-11-20 PROCEDURE — 6370000000 HC RX 637 (ALT 250 FOR IP): Performed by: EMERGENCY MEDICINE

## 2024-11-20 PROCEDURE — 96372 THER/PROPH/DIAG INJ SC/IM: CPT

## 2024-11-20 RX ORDER — ONDANSETRON 4 MG/1
4 TABLET, ORALLY DISINTEGRATING ORAL EVERY 8 HOURS PRN
Qty: 20 TABLET | Refills: 0 | Status: SHIPPED | OUTPATIENT
Start: 2024-11-20 | End: 2024-11-27

## 2024-11-20 RX ORDER — MAGNESIUM HYDROXIDE/ALUMINUM HYDROXICE/SIMETHICONE 120; 1200; 1200 MG/30ML; MG/30ML; MG/30ML
30 SUSPENSION ORAL
Status: COMPLETED | OUTPATIENT
Start: 2024-11-20 | End: 2024-11-20

## 2024-11-20 RX ORDER — DICYCLOMINE HYDROCHLORIDE 10 MG/1
10 CAPSULE ORAL EVERY 6 HOURS PRN
Qty: 20 CAPSULE | Refills: 0 | Status: SHIPPED | OUTPATIENT
Start: 2024-11-20 | End: 2024-11-25

## 2024-11-20 RX ORDER — DICYCLOMINE HYDROCHLORIDE 10 MG/ML
20 INJECTION INTRAMUSCULAR ONCE
Status: COMPLETED | OUTPATIENT
Start: 2024-11-20 | End: 2024-11-20

## 2024-11-20 RX ADMIN — DICYCLOMINE HYDROCHLORIDE 20 MG: 10 INJECTION, SOLUTION INTRAMUSCULAR at 08:01

## 2024-11-20 RX ADMIN — ALUMINUM HYDROXIDE, MAGNESIUM HYDROXIDE, AND SIMETHICONE 30 ML: 200; 200; 20 SUSPENSION ORAL at 08:01

## 2024-11-20 ASSESSMENT — PAIN DESCRIPTION - ORIENTATION
ORIENTATION: LEFT
ORIENTATION: LEFT

## 2024-11-20 ASSESSMENT — PAIN - FUNCTIONAL ASSESSMENT: PAIN_FUNCTIONAL_ASSESSMENT: 0-10

## 2024-11-20 ASSESSMENT — PAIN DESCRIPTION - DESCRIPTORS
DESCRIPTORS: STABBING;SHARP
DESCRIPTORS: SHARP;STABBING

## 2024-11-20 ASSESSMENT — PAIN SCALES - GENERAL
PAINLEVEL_OUTOF10: 7
PAINLEVEL_OUTOF10: 8

## 2024-11-20 ASSESSMENT — PAIN DESCRIPTION - ONSET
ONSET: SUDDEN
ONSET: SUDDEN

## 2024-11-20 ASSESSMENT — ENCOUNTER SYMPTOMS
RHINORRHEA: 0
CONSTIPATION: 0
COLOR CHANGE: 0
EYE PAIN: 0
FACIAL SWELLING: 0
BLOOD IN STOOL: 0
VOMITING: 0
SHORTNESS OF BREATH: 0
BACK PAIN: 0
CHEST TIGHTNESS: 0
SINUS PRESSURE: 0
DIARRHEA: 0
EYE DISCHARGE: 0
TROUBLE SWALLOWING: 0
COUGH: 0
EYE REDNESS: 0
WHEEZING: 0
SORE THROAT: 0
NAUSEA: 0
ABDOMINAL PAIN: 1

## 2024-11-20 ASSESSMENT — PAIN DESCRIPTION - LOCATION
LOCATION: ABDOMEN
LOCATION: ABDOMEN

## 2024-11-20 ASSESSMENT — PAIN DESCRIPTION - PAIN TYPE
TYPE: ACUTE PAIN
TYPE: ACUTE PAIN

## 2024-11-20 NOTE — ED PROVIDER NOTES
eMERGENCY dEPARTMENT eNCOUnter      Pt Name: Aj Stewart  MRN: [id number]  Birthdate 1990  Date of evaluation: 11/20/24      CHIEF COMPLAINT       Chief Complaint   Patient presents with    Abdominal Pain     LUQ abdominal pain started this morning around 0645. Patient describes as sharp and stabbing. Patient denies fever/chills, Nausea/Vomiting.          HISTORY OF PRESENT ILLNESS    Aj Stewart is a 34 y.o. male who presents complaining of abdominal pain.  Patient states that he had sudden onset of severe sharp stabbing pain in the epigastrium and left upper quadrant this morning when he woke up.  Patient states the pain does not radiate.  Patient denies any nausea vomiting diarrhea or constipation.  Patient states he is never had anything like this before.  Patient's had no new medications he does not drink alcohol.  Patient has had no dysuria or hematuria.      REVIEW OF SYSTEMS       Review of Systems   Constitutional:  Negative for activity change, appetite change, chills, diaphoresis and fever.   HENT:  Negative for congestion, ear pain, facial swelling, nosebleeds, rhinorrhea, sinus pressure, sore throat and trouble swallowing.    Eyes:  Negative for pain, discharge and redness.   Respiratory:  Negative for cough, chest tightness, shortness of breath and wheezing.    Cardiovascular:  Negative for chest pain, palpitations and leg swelling.   Gastrointestinal:  Positive for abdominal pain. Negative for blood in stool, constipation, diarrhea, nausea and vomiting.   Genitourinary:  Negative for difficulty urinating, dysuria, flank pain, frequency, genital sores and hematuria.   Musculoskeletal:  Negative for arthralgias, back pain, gait problem, joint swelling, myalgias and neck pain.   Skin:  Negative for color change, pallor, rash and wound.   Neurological:  Negative for dizziness, tremors, seizures, syncope, speech difficulty, weakness, numbness and headaches.   Psychiatric/Behavioral:  Negative